# Patient Record
Sex: FEMALE | Race: WHITE | Employment: OTHER | URBAN - METROPOLITAN AREA
[De-identification: names, ages, dates, MRNs, and addresses within clinical notes are randomized per-mention and may not be internally consistent; named-entity substitution may affect disease eponyms.]

---

## 2019-01-01 ENCOUNTER — APPOINTMENT (OUTPATIENT)
Dept: CT IMAGING | Age: 84
End: 2019-01-01
Payer: MEDICARE

## 2019-01-01 ENCOUNTER — APPOINTMENT (OUTPATIENT)
Dept: GENERAL RADIOLOGY | Age: 84
End: 2019-01-01
Payer: MEDICARE

## 2019-01-01 ENCOUNTER — APPOINTMENT (OUTPATIENT)
Dept: CT IMAGING | Age: 84
DRG: 057 | End: 2019-01-01
Payer: MEDICARE

## 2019-01-01 ENCOUNTER — HOSPITAL ENCOUNTER (EMERGENCY)
Age: 84
Discharge: HOME | End: 2019-07-27
Attending: EMERGENCY MEDICINE
Payer: MEDICARE

## 2019-01-01 ENCOUNTER — HOSPITAL ENCOUNTER (INPATIENT)
Age: 84
LOS: 18 days | Discharge: HOME HEALTH | DRG: 057 | End: 2019-07-03
Attending: EMERGENCY MEDICINE | Admitting: PSYCHIATRY & NEUROLOGY
Payer: MEDICARE

## 2019-01-01 VITALS
HEART RATE: 62 BPM | WEIGHT: 206 LBS | SYSTOLIC BLOOD PRESSURE: 113 MMHG | BODY MASS INDEX: 35.17 KG/M2 | HEIGHT: 64 IN | DIASTOLIC BLOOD PRESSURE: 63 MMHG | RESPIRATION RATE: 16 BRPM | OXYGEN SATURATION: 93 % | TEMPERATURE: 97.7 F

## 2019-01-01 VITALS
WEIGHT: 206 LBS | TEMPERATURE: 98.3 F | HEART RATE: 67 BPM | OXYGEN SATURATION: 93 % | DIASTOLIC BLOOD PRESSURE: 68 MMHG | BODY MASS INDEX: 35.17 KG/M2 | HEIGHT: 64 IN | RESPIRATION RATE: 19 BRPM | SYSTOLIC BLOOD PRESSURE: 126 MMHG

## 2019-01-01 DIAGNOSIS — F03.91 DEMENTIA WITH BEHAVIORAL DISTURBANCE, UNSPECIFIED DEMENTIA TYPE: Primary | ICD-10-CM

## 2019-01-01 DIAGNOSIS — F03.91 DEMENTIA WITH BEHAVIORAL DISTURBANCE, UNSPECIFIED DEMENTIA TYPE: ICD-10-CM

## 2019-01-01 DIAGNOSIS — R53.1 GENERALIZED WEAKNESS: Primary | ICD-10-CM

## 2019-01-01 LAB
A/G RATIO: 1.3 (ref 1.1–2.2)
A/G RATIO: 1.3 (ref 1.1–2.2)
ACETAMINOPHEN LEVEL: <5 UG/ML (ref 10–30)
ALBUMIN SERPL-MCNC: 3.7 G/DL (ref 3.4–5)
ALBUMIN SERPL-MCNC: 4 G/DL (ref 3.4–5)
ALP BLD-CCNC: 84 U/L (ref 40–129)
ALP BLD-CCNC: 92 U/L (ref 40–129)
ALT SERPL-CCNC: 10 U/L (ref 10–40)
ALT SERPL-CCNC: 23 U/L (ref 10–40)
AMPHETAMINE SCREEN, URINE: NORMAL
AMPHETAMINE SCREEN, URINE: NORMAL
ANION GAP SERPL CALCULATED.3IONS-SCNC: 11 MMOL/L (ref 3–16)
ANION GAP SERPL CALCULATED.3IONS-SCNC: 11 MMOL/L (ref 3–16)
AST SERPL-CCNC: 20 U/L (ref 15–37)
AST SERPL-CCNC: 28 U/L (ref 15–37)
BACTERIA: ABNORMAL /HPF
BARBITURATE SCREEN URINE: NORMAL
BARBITURATE SCREEN URINE: NORMAL
BASE EXCESS VENOUS: 2.9 MMOL/L (ref -3–3)
BASOPHILS ABSOLUTE: 0 K/UL (ref 0–0.2)
BASOPHILS ABSOLUTE: 0 K/UL (ref 0–0.2)
BASOPHILS RELATIVE PERCENT: 0.3 %
BASOPHILS RELATIVE PERCENT: 0.7 %
BENZODIAZEPINE SCREEN, URINE: NORMAL
BENZODIAZEPINE SCREEN, URINE: NORMAL
BILIRUB SERPL-MCNC: 0.4 MG/DL (ref 0–1)
BILIRUB SERPL-MCNC: 0.5 MG/DL (ref 0–1)
BILIRUBIN URINE: ABNORMAL
BILIRUBIN URINE: NEGATIVE
BLOOD CULTURE, ROUTINE: NORMAL
BLOOD, URINE: NEGATIVE
BLOOD, URINE: NEGATIVE
BUN BLDV-MCNC: 14 MG/DL (ref 7–20)
BUN BLDV-MCNC: 21 MG/DL (ref 7–20)
CALCIUM SERPL-MCNC: 9.1 MG/DL (ref 8.3–10.6)
CALCIUM SERPL-MCNC: 9.5 MG/DL (ref 8.3–10.6)
CANNABINOID SCREEN URINE: NORMAL
CANNABINOID SCREEN URINE: NORMAL
CARBAMAZEPINE DOSE: NORMAL
CARBAMAZEPINE LEVEL: 5.7 UG/ML (ref 4–12)
CARBOXYHEMOGLOBIN: 2.5 % (ref 0–1.5)
CASTS: ABNORMAL /LPF
CHLORIDE BLD-SCNC: 92 MMOL/L (ref 99–110)
CHLORIDE BLD-SCNC: 97 MMOL/L (ref 99–110)
CHOLESTEROL, TOTAL: 139 MG/DL (ref 0–199)
CLARITY: ABNORMAL
CLARITY: CLEAR
CO2: 27 MMOL/L (ref 21–32)
CO2: 28 MMOL/L (ref 21–32)
COCAINE METABOLITE SCREEN URINE: NORMAL
COCAINE METABOLITE SCREEN URINE: NORMAL
COLOR: YELLOW
COLOR: YELLOW
CREAT SERPL-MCNC: 1.3 MG/DL (ref 0.6–1.2)
CREAT SERPL-MCNC: 1.8 MG/DL (ref 0.6–1.2)
CULTURE, BLOOD 2: NORMAL
EKG ATRIAL RATE: 66 BPM
EKG ATRIAL RATE: 67 BPM
EKG ATRIAL RATE: 75 BPM
EKG DIAGNOSIS: NORMAL
EKG P AXIS: 100 DEGREES
EKG P-R INTERVAL: 248 MS
EKG P-R INTERVAL: 358 MS
EKG Q-T INTERVAL: 448 MS
EKG Q-T INTERVAL: 454 MS
EKG Q-T INTERVAL: 474 MS
EKG QRS DURATION: 164 MS
EKG QRS DURATION: 176 MS
EKG QRS DURATION: 98 MS
EKG QTC CALCULATION (BAZETT): 475 MS
EKG QTC CALCULATION (BAZETT): 500 MS
EKG QTC CALCULATION (BAZETT): 500 MS
EKG R AXIS: -74 DEGREES
EKG R AXIS: -75 DEGREES
EKG R AXIS: 66 DEGREES
EKG T AXIS: 17 DEGREES
EKG T AXIS: 87 DEGREES
EKG T AXIS: 90 DEGREES
EKG VENTRICULAR RATE: 66 BPM
EKG VENTRICULAR RATE: 67 BPM
EKG VENTRICULAR RATE: 75 BPM
EOSINOPHILS ABSOLUTE: 0 K/UL (ref 0–0.6)
EOSINOPHILS ABSOLUTE: 0.1 K/UL (ref 0–0.6)
EOSINOPHILS RELATIVE PERCENT: 0.3 %
EOSINOPHILS RELATIVE PERCENT: 1.4 %
EPITHELIAL CELLS, UA: ABNORMAL /HPF
EPITHELIAL CELLS, UA: ABNORMAL /HPF
ESTIMATED AVERAGE GLUCOSE: 85.3 MG/DL
ETHANOL: NORMAL MG/DL (ref 0–0.08)
ETHANOL: NORMAL MG/DL (ref 0–0.08)
GFR AFRICAN AMERICAN: 32
GFR AFRICAN AMERICAN: 47
GFR NON-AFRICAN AMERICAN: 27
GFR NON-AFRICAN AMERICAN: 39
GLOBULIN: 2.9 G/DL
GLOBULIN: 3 G/DL
GLUCOSE BLD-MCNC: 101 MG/DL (ref 70–99)
GLUCOSE BLD-MCNC: 107 MG/DL (ref 70–99)
GLUCOSE URINE: NEGATIVE MG/DL
GLUCOSE URINE: NEGATIVE MG/DL
HBA1C MFR BLD: 4.6 %
HCO3 VENOUS: 28.5 MMOL/L (ref 23–29)
HCT VFR BLD CALC: 37.7 % (ref 36–48)
HCT VFR BLD CALC: 40.6 % (ref 36–48)
HDLC SERPL-MCNC: 76 MG/DL (ref 40–60)
HEMOGLOBIN: 12.8 G/DL (ref 12–16)
HEMOGLOBIN: 13.8 G/DL (ref 12–16)
KETONES, URINE: NEGATIVE MG/DL
KETONES, URINE: NEGATIVE MG/DL
LACTIC ACID: 0.8 MMOL/L (ref 0.4–2)
LDL CHOLESTEROL CALCULATED: 49 MG/DL
LEUKOCYTE ESTERASE, URINE: ABNORMAL
LEUKOCYTE ESTERASE, URINE: NEGATIVE
LYMPHOCYTES ABSOLUTE: 0.6 K/UL (ref 1–5.1)
LYMPHOCYTES ABSOLUTE: 0.7 K/UL (ref 1–5.1)
LYMPHOCYTES RELATIVE PERCENT: 14.6 %
LYMPHOCYTES RELATIVE PERCENT: 15 %
Lab: NORMAL
Lab: NORMAL
MCH RBC QN AUTO: 32.6 PG (ref 26–34)
MCH RBC QN AUTO: 33.2 PG (ref 26–34)
MCHC RBC AUTO-ENTMCNC: 34 G/DL (ref 31–36)
MCHC RBC AUTO-ENTMCNC: 34.1 G/DL (ref 31–36)
MCV RBC AUTO: 96 FL (ref 80–100)
MCV RBC AUTO: 97.4 FL (ref 80–100)
METHADONE SCREEN, URINE: NORMAL
METHADONE SCREEN, URINE: NORMAL
METHEMOGLOBIN VENOUS: 0.3 %
MICROSCOPIC EXAMINATION: YES
MICROSCOPIC EXAMINATION: YES
MONOCYTES ABSOLUTE: 0.2 K/UL (ref 0–1.3)
MONOCYTES ABSOLUTE: 0.4 K/UL (ref 0–1.3)
MONOCYTES RELATIVE PERCENT: 6.4 %
MONOCYTES RELATIVE PERCENT: 8 %
MUCUS: ABNORMAL /LPF
NEUTROPHILS ABSOLUTE: 3 K/UL (ref 1.7–7.7)
NEUTROPHILS ABSOLUTE: 3.7 K/UL (ref 1.7–7.7)
NEUTROPHILS RELATIVE PERCENT: 74.9 %
NEUTROPHILS RELATIVE PERCENT: 78.4 %
NITRITE, URINE: NEGATIVE
NITRITE, URINE: NEGATIVE
O2 CONTENT, VEN: 17 VOL %
O2 SAT, VEN: 89 %
O2 THERAPY: ABNORMAL
OPIATE SCREEN URINE: NORMAL
OPIATE SCREEN URINE: NORMAL
OXYCODONE URINE: NORMAL
OXYCODONE URINE: NORMAL
PCO2, VEN: 47.1 MMHG (ref 40–50)
PDW BLD-RTO: 14.5 % (ref 12.4–15.4)
PDW BLD-RTO: 14.9 % (ref 12.4–15.4)
PH UA: 6
PH UA: 6
PH UA: 6 (ref 5–8)
PH UA: 7 (ref 5–8)
PH VENOUS: 7.4 (ref 7.35–7.45)
PHENCYCLIDINE SCREEN URINE: NORMAL
PHENCYCLIDINE SCREEN URINE: NORMAL
PLATELET # BLD: 130 K/UL (ref 135–450)
PLATELET # BLD: 159 K/UL (ref 135–450)
PMV BLD AUTO: 8.3 FL (ref 5–10.5)
PMV BLD AUTO: 8.4 FL (ref 5–10.5)
PO2, VEN: 56.4 MMHG (ref 25–40)
POTASSIUM REFLEX MAGNESIUM: 5.4 MMOL/L (ref 3.5–5.1)
POTASSIUM SERPL-SCNC: 4 MMOL/L (ref 3.5–5.1)
PROPOXYPHENE SCREEN: NORMAL
PROPOXYPHENE SCREEN: NORMAL
PROTEIN UA: ABNORMAL MG/DL
PROTEIN UA: NEGATIVE MG/DL
RBC # BLD: 3.87 M/UL (ref 4–5.2)
RBC # BLD: 4.23 M/UL (ref 4–5.2)
RBC UA: ABNORMAL /HPF (ref 0–2)
RBC UA: ABNORMAL /HPF (ref 0–2)
SALICYLATE, SERUM: <0.3 MG/DL (ref 15–30)
SODIUM BLD-SCNC: 130 MMOL/L (ref 136–145)
SODIUM BLD-SCNC: 136 MMOL/L (ref 136–145)
SPECIFIC GRAVITY UA: 1.01 (ref 1–1.03)
SPECIFIC GRAVITY UA: 1.02 (ref 1–1.03)
T4 TOTAL: 10.2 UG/DL (ref 4.5–10.9)
TCO2 CALC VENOUS: 30 MMOL/L
TOTAL PROTEIN: 6.6 G/DL (ref 6.4–8.2)
TOTAL PROTEIN: 7 G/DL (ref 6.4–8.2)
TRIGL SERPL-MCNC: 72 MG/DL (ref 0–150)
TROPONIN: <0.01 NG/ML
TSH SERPL DL<=0.05 MIU/L-ACNC: 2.05 UIU/ML (ref 0.27–4.2)
URINE REFLEX TO CULTURE: ABNORMAL
URINE TYPE: ABNORMAL
URINE TYPE: ABNORMAL
UROBILINOGEN, URINE: 0.2 E.U./DL
UROBILINOGEN, URINE: 0.2 E.U./DL
VLDLC SERPL CALC-MCNC: 14 MG/DL
WBC # BLD: 3.9 K/UL (ref 4–11)
WBC # BLD: 5 K/UL (ref 4–11)
WBC UA: ABNORMAL /HPF (ref 0–5)
WBC UA: ABNORMAL /HPF (ref 0–5)

## 2019-01-01 PROCEDURE — 36415 COLL VENOUS BLD VENIPUNCTURE: CPT

## 2019-01-01 PROCEDURE — 6370000000 HC RX 637 (ALT 250 FOR IP): Performed by: PSYCHIATRY & NEUROLOGY

## 2019-01-01 PROCEDURE — 1240000000 HC EMOTIONAL WELLNESS R&B

## 2019-01-01 PROCEDURE — 99232 SBSQ HOSP IP/OBS MODERATE 35: CPT | Performed by: PSYCHIATRY & NEUROLOGY

## 2019-01-01 PROCEDURE — 93005 ELECTROCARDIOGRAM TRACING: CPT | Performed by: EMERGENCY MEDICINE

## 2019-01-01 PROCEDURE — 6370000000 HC RX 637 (ALT 250 FOR IP): Performed by: EMERGENCY MEDICINE

## 2019-01-01 PROCEDURE — 80307 DRUG TEST PRSMV CHEM ANLYZR: CPT

## 2019-01-01 PROCEDURE — 99223 1ST HOSP IP/OBS HIGH 75: CPT | Performed by: PSYCHIATRY & NEUROLOGY

## 2019-01-01 PROCEDURE — 84484 ASSAY OF TROPONIN QUANT: CPT

## 2019-01-01 PROCEDURE — G0480 DRUG TEST DEF 1-7 CLASSES: HCPCS

## 2019-01-01 PROCEDURE — 97116 GAIT TRAINING THERAPY: CPT

## 2019-01-01 PROCEDURE — 99233 SBSQ HOSP IP/OBS HIGH 50: CPT | Performed by: PSYCHIATRY & NEUROLOGY

## 2019-01-01 PROCEDURE — 97535 SELF CARE MNGMENT TRAINING: CPT

## 2019-01-01 PROCEDURE — 83605 ASSAY OF LACTIC ACID: CPT

## 2019-01-01 PROCEDURE — 99285 EMERGENCY DEPT VISIT HI MDM: CPT

## 2019-01-01 PROCEDURE — 93010 ELECTROCARDIOGRAM REPORT: CPT | Performed by: INTERNAL MEDICINE

## 2019-01-01 PROCEDURE — 96361 HYDRATE IV INFUSION ADD-ON: CPT

## 2019-01-01 PROCEDURE — 97161 PT EVAL LOW COMPLEX 20 MIN: CPT

## 2019-01-01 PROCEDURE — 97110 THERAPEUTIC EXERCISES: CPT

## 2019-01-01 PROCEDURE — 80156 ASSAY CARBAMAZEPINE TOTAL: CPT

## 2019-01-01 PROCEDURE — 85025 COMPLETE CBC W/AUTO DIFF WBC: CPT

## 2019-01-01 PROCEDURE — 94150 VITAL CAPACITY TEST: CPT

## 2019-01-01 PROCEDURE — 84443 ASSAY THYROID STIM HORMONE: CPT

## 2019-01-01 PROCEDURE — 70450 CT HEAD/BRAIN W/O DYE: CPT

## 2019-01-01 PROCEDURE — 72125 CT NECK SPINE W/O DYE: CPT

## 2019-01-01 PROCEDURE — 99233 SBSQ HOSP IP/OBS HIGH 50: CPT | Performed by: NURSE PRACTITIONER

## 2019-01-01 PROCEDURE — 2580000003 HC RX 258: Performed by: EMERGENCY MEDICINE

## 2019-01-01 PROCEDURE — 81001 URINALYSIS AUTO W/SCOPE: CPT

## 2019-01-01 PROCEDURE — 97530 THERAPEUTIC ACTIVITIES: CPT

## 2019-01-01 PROCEDURE — 51701 INSERT BLADDER CATHETER: CPT

## 2019-01-01 PROCEDURE — 93005 ELECTROCARDIOGRAM TRACING: CPT | Performed by: PSYCHIATRY & NEUROLOGY

## 2019-01-01 PROCEDURE — 96372 THER/PROPH/DIAG INJ SC/IM: CPT

## 2019-01-01 PROCEDURE — 74176 CT ABD & PELVIS W/O CONTRAST: CPT

## 2019-01-01 PROCEDURE — 99239 HOSP IP/OBS DSCHRG MGMT >30: CPT | Performed by: PSYCHIATRY & NEUROLOGY

## 2019-01-01 PROCEDURE — 71045 X-RAY EXAM CHEST 1 VIEW: CPT

## 2019-01-01 PROCEDURE — 2500000003 HC RX 250 WO HCPCS: Performed by: PSYCHIATRY & NEUROLOGY

## 2019-01-01 PROCEDURE — 5130000000 HC BRIDGE APPOINTMENT

## 2019-01-01 PROCEDURE — 80053 COMPREHEN METABOLIC PANEL: CPT

## 2019-01-01 PROCEDURE — 2580000003 HC RX 258

## 2019-01-01 PROCEDURE — 97165 OT EVAL LOW COMPLEX 30 MIN: CPT

## 2019-01-01 PROCEDURE — 87040 BLOOD CULTURE FOR BACTERIA: CPT

## 2019-01-01 PROCEDURE — 84436 ASSAY OF TOTAL THYROXINE: CPT

## 2019-01-01 PROCEDURE — 96360 HYDRATION IV INFUSION INIT: CPT

## 2019-01-01 PROCEDURE — 82803 BLOOD GASES ANY COMBINATION: CPT

## 2019-01-01 PROCEDURE — 83036 HEMOGLOBIN GLYCOSYLATED A1C: CPT

## 2019-01-01 PROCEDURE — 99222 1ST HOSP IP/OBS MODERATE 55: CPT | Performed by: PHYSICIAN ASSISTANT

## 2019-01-01 PROCEDURE — 80061 LIPID PANEL: CPT

## 2019-01-01 RX ORDER — TRAZODONE HYDROCHLORIDE 50 MG/1
25 TABLET ORAL NIGHTLY PRN
Status: DISCONTINUED | OUTPATIENT
Start: 2019-01-01 | End: 2019-01-01 | Stop reason: HOSPADM

## 2019-01-01 RX ORDER — LANOLIN ALCOHOL/MO/W.PET/CERES
6 CREAM (GRAM) TOPICAL NIGHTLY PRN
Status: DISCONTINUED | OUTPATIENT
Start: 2019-01-01 | End: 2019-01-01

## 2019-01-01 RX ORDER — ACETAMINOPHEN 325 MG/1
650 TABLET ORAL EVERY 4 HOURS PRN
Status: DISCONTINUED | OUTPATIENT
Start: 2019-01-01 | End: 2019-01-01 | Stop reason: HOSPADM

## 2019-01-01 RX ORDER — OLANZAPINE 5 MG/1
2.5 TABLET ORAL EVERY 6 HOURS PRN
Status: DISCONTINUED | OUTPATIENT
Start: 2019-01-01 | End: 2019-01-01 | Stop reason: HOSPADM

## 2019-01-01 RX ORDER — DONEPEZIL HYDROCHLORIDE 5 MG/1
5 TABLET, FILM COATED ORAL
Qty: 30 TABLET | Refills: 1 | Status: SHIPPED | OUTPATIENT
Start: 2019-01-01

## 2019-01-01 RX ORDER — DONEPEZIL HYDROCHLORIDE 5 MG/1
5 TABLET, FILM COATED ORAL NIGHTLY
Status: DISCONTINUED | OUTPATIENT
Start: 2019-01-01 | End: 2019-01-01 | Stop reason: HOSPADM

## 2019-01-01 RX ORDER — LANOLIN ALCOHOL/MO/W.PET/CERES
6 CREAM (GRAM) TOPICAL NIGHTLY
Qty: 60 TABLET | Refills: 1 | Status: SHIPPED | OUTPATIENT
Start: 2019-01-01

## 2019-01-01 RX ORDER — ASPIRIN 81 MG/1
81 TABLET, CHEWABLE ORAL DAILY
Status: DISCONTINUED | OUTPATIENT
Start: 2019-01-01 | End: 2019-01-01

## 2019-01-01 RX ORDER — OLANZAPINE 10 MG/1
5 INJECTION, POWDER, LYOPHILIZED, FOR SOLUTION INTRAMUSCULAR 2 TIMES DAILY PRN
Status: DISCONTINUED | OUTPATIENT
Start: 2019-01-01 | End: 2019-01-01 | Stop reason: HOSPADM

## 2019-01-01 RX ORDER — SENNA PLUS 8.6 MG/1
1 TABLET ORAL
COMMUNITY

## 2019-01-01 RX ORDER — CARBAMAZEPINE 200 MG/1
100 TABLET ORAL 2 TIMES DAILY
Qty: 30 TABLET | Refills: 1 | Status: SHIPPED | OUTPATIENT
Start: 2019-01-01

## 2019-01-01 RX ORDER — CEFUROXIME AXETIL 250 MG/1
500 TABLET ORAL ONCE
Status: COMPLETED | OUTPATIENT
Start: 2019-01-01 | End: 2019-01-01

## 2019-01-01 RX ORDER — RISPERIDONE 1 MG/1
1 TABLET, ORALLY DISINTEGRATING ORAL 2 TIMES DAILY
Status: DISCONTINUED | OUTPATIENT
Start: 2019-01-01 | End: 2019-01-01

## 2019-01-01 RX ORDER — QUETIAPINE FUMARATE 25 MG/1
25 TABLET, FILM COATED ORAL ONCE
Status: COMPLETED | OUTPATIENT
Start: 2019-01-01 | End: 2019-01-01

## 2019-01-01 RX ORDER — HYDROCHLOROTHIAZIDE 12.5 MG/1
12.5 CAPSULE, GELATIN COATED ORAL DAILY
Status: ON HOLD | COMMUNITY
Start: 2019-01-01 | End: 2019-01-01 | Stop reason: HOSPADM

## 2019-01-01 RX ORDER — CARBAMAZEPINE 200 MG/1
100 TABLET ORAL 2 TIMES DAILY
Status: DISCONTINUED | OUTPATIENT
Start: 2019-01-01 | End: 2019-01-01 | Stop reason: HOSPADM

## 2019-01-01 RX ORDER — RISPERIDONE 1 MG/1
TABLET, FILM COATED ORAL
Qty: 90 TABLET | Refills: 1 | Status: SHIPPED | OUTPATIENT
Start: 2019-01-01

## 2019-01-01 RX ORDER — SENNA PLUS 8.6 MG/1
2 TABLET ORAL DAILY
Status: DISCONTINUED | OUTPATIENT
Start: 2019-01-01 | End: 2019-01-01 | Stop reason: HOSPADM

## 2019-01-01 RX ORDER — METOPROLOL SUCCINATE 50 MG/1
50 TABLET, EXTENDED RELEASE ORAL DAILY
COMMUNITY

## 2019-01-01 RX ORDER — LANOLIN ALCOHOL/MO/W.PET/CERES
6 CREAM (GRAM) TOPICAL NIGHTLY
Status: DISCONTINUED | OUTPATIENT
Start: 2019-01-01 | End: 2019-01-01 | Stop reason: HOSPADM

## 2019-01-01 RX ORDER — LORAZEPAM 0.5 MG/1
0.5 TABLET ORAL EVERY 4 HOURS PRN
Status: DISCONTINUED | OUTPATIENT
Start: 2019-01-01 | End: 2019-01-01 | Stop reason: HOSPADM

## 2019-01-01 RX ORDER — METOPROLOL SUCCINATE 25 MG/1
50 TABLET, EXTENDED RELEASE ORAL DAILY
Status: DISCONTINUED | OUTPATIENT
Start: 2019-01-01 | End: 2019-01-01 | Stop reason: HOSPADM

## 2019-01-01 RX ORDER — OLANZAPINE 10 MG/1
5 INJECTION, POWDER, LYOPHILIZED, FOR SOLUTION INTRAMUSCULAR ONCE
Status: COMPLETED | OUTPATIENT
Start: 2019-01-01 | End: 2019-01-01

## 2019-01-01 RX ORDER — RISPERIDONE 1 MG/1
1 TABLET, ORALLY DISINTEGRATING ORAL DAILY
Status: DISCONTINUED | OUTPATIENT
Start: 2019-01-01 | End: 2019-01-01 | Stop reason: HOSPADM

## 2019-01-01 RX ORDER — RISPERIDONE 1 MG/1
2 TABLET, ORALLY DISINTEGRATING ORAL NIGHTLY
Status: DISCONTINUED | OUTPATIENT
Start: 2019-01-01 | End: 2019-01-01 | Stop reason: HOSPADM

## 2019-01-01 RX ORDER — HYDROCHLOROTHIAZIDE 25 MG/1
12.5 TABLET ORAL DAILY
Status: DISCONTINUED | OUTPATIENT
Start: 2019-01-01 | End: 2019-01-01

## 2019-01-01 RX ORDER — METOPROLOL TARTRATE 50 MG/1
50 TABLET, FILM COATED ORAL 2 TIMES DAILY
Status: ON HOLD | COMMUNITY
Start: 2019-01-01 | End: 2019-01-01 | Stop reason: HOSPADM

## 2019-01-01 RX ORDER — ALBUTEROL SULFATE 90 UG/1
2 AEROSOL, METERED RESPIRATORY (INHALATION) EVERY 6 HOURS PRN
Status: DISCONTINUED | OUTPATIENT
Start: 2019-01-01 | End: 2019-01-01 | Stop reason: HOSPADM

## 2019-01-01 RX ORDER — AMIODARONE HYDROCHLORIDE 200 MG/1
200 TABLET ORAL NIGHTLY
Status: DISCONTINUED | OUTPATIENT
Start: 2019-01-01 | End: 2019-01-01 | Stop reason: HOSPADM

## 2019-01-01 RX ORDER — ATORVASTATIN CALCIUM 10 MG/1
20 TABLET, FILM COATED ORAL DAILY
Status: DISCONTINUED | OUTPATIENT
Start: 2019-01-01 | End: 2019-01-01

## 2019-01-01 RX ORDER — TRAZODONE HYDROCHLORIDE 50 MG/1
25 TABLET ORAL NIGHTLY PRN
Qty: 15 TABLET | Refills: 1 | Status: SHIPPED | OUTPATIENT
Start: 2019-01-01

## 2019-01-01 RX ORDER — QUETIAPINE FUMARATE 25 MG/1
25 TABLET, FILM COATED ORAL NIGHTLY
Status: ON HOLD | COMMUNITY
End: 2019-01-01 | Stop reason: HOSPADM

## 2019-01-01 RX ORDER — 0.9 % SODIUM CHLORIDE 0.9 %
1000 INTRAVENOUS SOLUTION INTRAVENOUS ONCE
Status: COMPLETED | OUTPATIENT
Start: 2019-01-01 | End: 2019-01-01

## 2019-01-01 RX ORDER — MAGNESIUM HYDROXIDE/ALUMINUM HYDROXICE/SIMETHICONE 120; 1200; 1200 MG/30ML; MG/30ML; MG/30ML
30 SUSPENSION ORAL EVERY 6 HOURS PRN
Status: DISCONTINUED | OUTPATIENT
Start: 2019-01-01 | End: 2019-01-01 | Stop reason: HOSPADM

## 2019-01-01 RX ADMIN — CARBAMAZEPINE 100 MG: 200 TABLET ORAL at 08:35

## 2019-01-01 RX ADMIN — AMIODARONE HYDROCHLORIDE 200 MG: 200 TABLET ORAL at 20:39

## 2019-01-01 RX ADMIN — DONEPEZIL HYDROCHLORIDE 5 MG: 5 TABLET, FILM COATED ORAL at 20:14

## 2019-01-01 RX ADMIN — CARBAMAZEPINE 100 MG: 200 TABLET ORAL at 21:16

## 2019-01-01 RX ADMIN — DONEPEZIL HYDROCHLORIDE 5 MG: 5 TABLET, FILM COATED ORAL at 20:44

## 2019-01-01 RX ADMIN — RISPERIDONE 1 MG: 1 TABLET, ORALLY DISINTEGRATING ORAL at 21:25

## 2019-01-01 RX ADMIN — SENNOSIDES 17.2 MG: 8.6 TABLET, FILM COATED ORAL at 09:07

## 2019-01-01 RX ADMIN — DONEPEZIL HYDROCHLORIDE 5 MG: 5 TABLET, FILM COATED ORAL at 20:39

## 2019-01-01 RX ADMIN — ACETAMINOPHEN 650 MG: 325 TABLET ORAL at 20:11

## 2019-01-01 RX ADMIN — METOPROLOL SUCCINATE 50 MG: 25 TABLET, FILM COATED, EXTENDED RELEASE ORAL at 08:10

## 2019-01-01 RX ADMIN — DONEPEZIL HYDROCHLORIDE 5 MG: 5 TABLET, FILM COATED ORAL at 20:52

## 2019-01-01 RX ADMIN — TRAZODONE HYDROCHLORIDE 25 MG: 50 TABLET ORAL at 20:45

## 2019-01-01 RX ADMIN — CARBAMAZEPINE 100 MG: 200 TABLET ORAL at 08:24

## 2019-01-01 RX ADMIN — AMIODARONE HYDROCHLORIDE 200 MG: 200 TABLET ORAL at 20:21

## 2019-01-01 RX ADMIN — METOPROLOL SUCCINATE 50 MG: 25 TABLET, FILM COATED, EXTENDED RELEASE ORAL at 09:43

## 2019-01-01 RX ADMIN — CARBAMAZEPINE 100 MG: 200 TABLET ORAL at 14:21

## 2019-01-01 RX ADMIN — WATER: 1 INJECTION INTRAMUSCULAR; INTRAVENOUS; SUBCUTANEOUS at 03:48

## 2019-01-01 RX ADMIN — ASPIRIN 81 MG 81 MG: 81 TABLET ORAL at 17:52

## 2019-01-01 RX ADMIN — AMIODARONE HYDROCHLORIDE 200 MG: 200 TABLET ORAL at 21:53

## 2019-01-01 RX ADMIN — METOPROLOL SUCCINATE 50 MG: 25 TABLET, FILM COATED, EXTENDED RELEASE ORAL at 08:34

## 2019-01-01 RX ADMIN — DONEPEZIL HYDROCHLORIDE 5 MG: 5 TABLET, FILM COATED ORAL at 20:29

## 2019-01-01 RX ADMIN — METOPROLOL SUCCINATE 50 MG: 25 TABLET, FILM COATED, EXTENDED RELEASE ORAL at 08:24

## 2019-01-01 RX ADMIN — SENNOSIDES 17.2 MG: 8.6 TABLET, FILM COATED ORAL at 09:05

## 2019-01-01 RX ADMIN — SENNOSIDES 17.2 MG: 8.6 TABLET, FILM COATED ORAL at 09:43

## 2019-01-01 RX ADMIN — RISPERIDONE 2 MG: 1 TABLET, ORALLY DISINTEGRATING ORAL at 20:30

## 2019-01-01 RX ADMIN — METOPROLOL SUCCINATE 50 MG: 25 TABLET, FILM COATED, EXTENDED RELEASE ORAL at 08:51

## 2019-01-01 RX ADMIN — DONEPEZIL HYDROCHLORIDE 5 MG: 5 TABLET, FILM COATED ORAL at 21:39

## 2019-01-01 RX ADMIN — ACETAMINOPHEN 650 MG: 325 TABLET ORAL at 20:45

## 2019-01-01 RX ADMIN — CARBAMAZEPINE 100 MG: 200 TABLET ORAL at 08:22

## 2019-01-01 RX ADMIN — RISPERIDONE 1 MG: 1 TABLET, ORALLY DISINTEGRATING ORAL at 21:53

## 2019-01-01 RX ADMIN — SENNOSIDES 17.2 MG: 8.6 TABLET, FILM COATED ORAL at 08:22

## 2019-01-01 RX ADMIN — SENNOSIDES 17.2 MG: 8.6 TABLET, FILM COATED ORAL at 09:32

## 2019-01-01 RX ADMIN — RISPERIDONE 2 MG: 1 TABLET, ORALLY DISINTEGRATING ORAL at 20:40

## 2019-01-01 RX ADMIN — MELATONIN 3 MG ORAL TABLET 6 MG: 3 TABLET ORAL at 20:28

## 2019-01-01 RX ADMIN — METOPROLOL SUCCINATE 50 MG: 25 TABLET, FILM COATED, EXTENDED RELEASE ORAL at 17:51

## 2019-01-01 RX ADMIN — MELATONIN 3 MG ORAL TABLET 6 MG: 3 TABLET ORAL at 20:45

## 2019-01-01 RX ADMIN — MELATONIN 3 MG ORAL TABLET 6 MG: 3 TABLET ORAL at 20:40

## 2019-01-01 RX ADMIN — CARBAMAZEPINE 100 MG: 200 TABLET ORAL at 10:38

## 2019-01-01 RX ADMIN — CARBAMAZEPINE 100 MG: 200 TABLET ORAL at 20:38

## 2019-01-01 RX ADMIN — CARBAMAZEPINE 100 MG: 200 TABLET ORAL at 09:43

## 2019-01-01 RX ADMIN — SENNOSIDES 17.2 MG: 8.6 TABLET, FILM COATED ORAL at 17:52

## 2019-01-01 RX ADMIN — CARBAMAZEPINE 100 MG: 200 TABLET ORAL at 09:31

## 2019-01-01 RX ADMIN — DONEPEZIL HYDROCHLORIDE 5 MG: 5 TABLET, FILM COATED ORAL at 20:22

## 2019-01-01 RX ADMIN — CARBAMAZEPINE 100 MG: 200 TABLET ORAL at 20:14

## 2019-01-01 RX ADMIN — RISPERIDONE 2 MG: 1 TABLET, ORALLY DISINTEGRATING ORAL at 20:21

## 2019-01-01 RX ADMIN — CARBAMAZEPINE 100 MG: 200 TABLET ORAL at 10:13

## 2019-01-01 RX ADMIN — CARBAMAZEPINE 100 MG: 200 TABLET ORAL at 08:51

## 2019-01-01 RX ADMIN — SENNOSIDES 17.2 MG: 8.6 TABLET, FILM COATED ORAL at 08:35

## 2019-01-01 RX ADMIN — RISPERIDONE 1 MG: 1 TABLET, ORALLY DISINTEGRATING ORAL at 08:12

## 2019-01-01 RX ADMIN — AMIODARONE HYDROCHLORIDE 200 MG: 200 TABLET ORAL at 21:04

## 2019-01-01 RX ADMIN — CARBAMAZEPINE 100 MG: 200 TABLET ORAL at 21:26

## 2019-01-01 RX ADMIN — ASPIRIN 81 MG 81 MG: 81 TABLET ORAL at 08:11

## 2019-01-01 RX ADMIN — MELATONIN 3 MG ORAL TABLET 6 MG: 3 TABLET ORAL at 21:53

## 2019-01-01 RX ADMIN — RISPERIDONE 1 MG: 1 TABLET, ORALLY DISINTEGRATING ORAL at 12:29

## 2019-01-01 RX ADMIN — QUETIAPINE FUMARATE 25 MG: 25 TABLET ORAL at 01:55

## 2019-01-01 RX ADMIN — CARBAMAZEPINE 100 MG: 200 TABLET ORAL at 09:05

## 2019-01-01 RX ADMIN — RISPERIDONE 2 MG: 1 TABLET, ORALLY DISINTEGRATING ORAL at 20:52

## 2019-01-01 RX ADMIN — CARBAMAZEPINE 100 MG: 200 TABLET ORAL at 12:29

## 2019-01-01 RX ADMIN — CARBAMAZEPINE 100 MG: 200 TABLET ORAL at 21:10

## 2019-01-01 RX ADMIN — TRAZODONE HYDROCHLORIDE 25 MG: 50 TABLET ORAL at 19:56

## 2019-01-01 RX ADMIN — RISPERIDONE 1 MG: 1 TABLET, ORALLY DISINTEGRATING ORAL at 09:05

## 2019-01-01 RX ADMIN — MELATONIN 3 MG ORAL TABLET 6 MG: 3 TABLET ORAL at 20:21

## 2019-01-01 RX ADMIN — MELATONIN 3 MG ORAL TABLET 6 MG: 3 TABLET ORAL at 21:07

## 2019-01-01 RX ADMIN — AMIODARONE HYDROCHLORIDE 200 MG: 200 TABLET ORAL at 20:36

## 2019-01-01 RX ADMIN — MELATONIN 3 MG ORAL TABLET 6 MG: 3 TABLET ORAL at 20:52

## 2019-01-01 RX ADMIN — RISPERIDONE 1 MG: 1 TABLET, ORALLY DISINTEGRATING ORAL at 20:11

## 2019-01-01 RX ADMIN — CARBAMAZEPINE 100 MG: 200 TABLET ORAL at 20:52

## 2019-01-01 RX ADMIN — RISPERIDONE 1 MG: 1 TABLET, ORALLY DISINTEGRATING ORAL at 09:07

## 2019-01-01 RX ADMIN — CARBAMAZEPINE 100 MG: 200 TABLET ORAL at 08:12

## 2019-01-01 RX ADMIN — MELATONIN 3 MG ORAL TABLET 6 MG: 3 TABLET ORAL at 20:38

## 2019-01-01 RX ADMIN — RISPERIDONE 1 MG: 1 TABLET, ORALLY DISINTEGRATING ORAL at 08:35

## 2019-01-01 RX ADMIN — SENNOSIDES 17.2 MG: 8.6 TABLET, FILM COATED ORAL at 10:38

## 2019-01-01 RX ADMIN — RISPERIDONE 2 MG: 1 TABLET, ORALLY DISINTEGRATING ORAL at 20:14

## 2019-01-01 RX ADMIN — CARBAMAZEPINE 100 MG: 200 TABLET ORAL at 20:11

## 2019-01-01 RX ADMIN — RISPERIDONE 2 MG: 1 TABLET, ORALLY DISINTEGRATING ORAL at 20:45

## 2019-01-01 RX ADMIN — AMIODARONE HYDROCHLORIDE 200 MG: 200 TABLET ORAL at 21:40

## 2019-01-01 RX ADMIN — CARBAMAZEPINE 100 MG: 200 TABLET ORAL at 20:42

## 2019-01-01 RX ADMIN — CARBAMAZEPINE 100 MG: 200 TABLET ORAL at 19:57

## 2019-01-01 RX ADMIN — AMIODARONE HYDROCHLORIDE 200 MG: 200 TABLET ORAL at 20:52

## 2019-01-01 RX ADMIN — AMIODARONE HYDROCHLORIDE 200 MG: 200 TABLET ORAL at 20:29

## 2019-01-01 RX ADMIN — RISPERIDONE 1 MG: 1 TABLET, ORALLY DISINTEGRATING ORAL at 08:24

## 2019-01-01 RX ADMIN — RISPERIDONE 1 MG: 1 TABLET, ORALLY DISINTEGRATING ORAL at 20:36

## 2019-01-01 RX ADMIN — RISPERIDONE 1 MG: 1 TABLET, ORALLY DISINTEGRATING ORAL at 08:51

## 2019-01-01 RX ADMIN — CARBAMAZEPINE 100 MG: 200 TABLET ORAL at 20:22

## 2019-01-01 RX ADMIN — ASPIRIN 81 MG 81 MG: 81 TABLET ORAL at 08:36

## 2019-01-01 RX ADMIN — DONEPEZIL HYDROCHLORIDE 5 MG: 5 TABLET, FILM COATED ORAL at 21:04

## 2019-01-01 RX ADMIN — SENNOSIDES 17.2 MG: 8.6 TABLET, FILM COATED ORAL at 08:11

## 2019-01-01 RX ADMIN — METOPROLOL SUCCINATE 50 MG: 25 TABLET, FILM COATED, EXTENDED RELEASE ORAL at 09:06

## 2019-01-01 RX ADMIN — DONEPEZIL HYDROCHLORIDE 5 MG: 5 TABLET, FILM COATED ORAL at 20:45

## 2019-01-01 RX ADMIN — RISPERIDONE 2 MG: 1 TABLET, ORALLY DISINTEGRATING ORAL at 20:38

## 2019-01-01 RX ADMIN — CARBAMAZEPINE 100 MG: 200 TABLET ORAL at 20:21

## 2019-01-01 RX ADMIN — METOPROLOL SUCCINATE 50 MG: 25 TABLET, FILM COATED, EXTENDED RELEASE ORAL at 08:23

## 2019-01-01 RX ADMIN — RISPERIDONE 1 MG: 1 TABLET, ORALLY DISINTEGRATING ORAL at 20:22

## 2019-01-01 RX ADMIN — SENNOSIDES 17.2 MG: 8.6 TABLET, FILM COATED ORAL at 10:13

## 2019-01-01 RX ADMIN — SENNOSIDES 17.2 MG: 8.6 TABLET, FILM COATED ORAL at 10:55

## 2019-01-01 RX ADMIN — RISPERIDONE 1 MG: 1 TABLET, ORALLY DISINTEGRATING ORAL at 10:39

## 2019-01-01 RX ADMIN — RISPERIDONE 1 MG: 1 TABLET, ORALLY DISINTEGRATING ORAL at 10:13

## 2019-01-01 RX ADMIN — RISPERIDONE 1 MG: 1 TABLET, ORALLY DISINTEGRATING ORAL at 19:56

## 2019-01-01 RX ADMIN — RISPERIDONE 1 MG: 1 TABLET, ORALLY DISINTEGRATING ORAL at 08:23

## 2019-01-01 RX ADMIN — OLANZAPINE 5 MG: 10 INJECTION, POWDER, LYOPHILIZED, FOR SOLUTION INTRAMUSCULAR at 03:05

## 2019-01-01 RX ADMIN — CARBAMAZEPINE 100 MG: 200 TABLET ORAL at 09:32

## 2019-01-01 RX ADMIN — CEFUROXIME AXETIL 500 MG: 250 TABLET ORAL at 00:44

## 2019-01-01 RX ADMIN — CARBAMAZEPINE 100 MG: 200 TABLET ORAL at 20:44

## 2019-01-01 RX ADMIN — CARBAMAZEPINE 100 MG: 200 TABLET ORAL at 21:40

## 2019-01-01 RX ADMIN — RISPERIDONE 1 MG: 1 TABLET, ORALLY DISINTEGRATING ORAL at 08:34

## 2019-01-01 RX ADMIN — MELATONIN 3 MG ORAL TABLET 6 MG: 3 TABLET ORAL at 20:14

## 2019-01-01 RX ADMIN — METOPROLOL SUCCINATE 50 MG: 25 TABLET, FILM COATED, EXTENDED RELEASE ORAL at 10:13

## 2019-01-01 RX ADMIN — RISPERIDONE 1 MG: 1 TABLET, ORALLY DISINTEGRATING ORAL at 09:32

## 2019-01-01 RX ADMIN — METOPROLOL SUCCINATE 50 MG: 25 TABLET, FILM COATED, EXTENDED RELEASE ORAL at 10:39

## 2019-01-01 RX ADMIN — CARBAMAZEPINE 100 MG: 200 TABLET ORAL at 20:45

## 2019-01-01 RX ADMIN — CARBAMAZEPINE 100 MG: 200 TABLET ORAL at 08:10

## 2019-01-01 RX ADMIN — METOPROLOL SUCCINATE 50 MG: 25 TABLET, FILM COATED, EXTENDED RELEASE ORAL at 09:05

## 2019-01-01 RX ADMIN — DONEPEZIL HYDROCHLORIDE 5 MG: 5 TABLET, FILM COATED ORAL at 20:21

## 2019-01-01 RX ADMIN — MELATONIN 3 MG ORAL TABLET 6 MG: 3 TABLET ORAL at 20:10

## 2019-01-01 RX ADMIN — LORAZEPAM 0.5 MG: 0.5 TABLET ORAL at 20:11

## 2019-01-01 RX ADMIN — AMIODARONE HYDROCHLORIDE 200 MG: 200 TABLET ORAL at 20:38

## 2019-01-01 RX ADMIN — RISPERIDONE 1 MG: 1 TABLET, ORALLY DISINTEGRATING ORAL at 09:43

## 2019-01-01 RX ADMIN — SENNOSIDES 17.2 MG: 8.6 TABLET, FILM COATED ORAL at 08:51

## 2019-01-01 RX ADMIN — CARBAMAZEPINE 100 MG: 200 TABLET ORAL at 20:28

## 2019-01-01 RX ADMIN — AMIODARONE HYDROCHLORIDE 200 MG: 200 TABLET ORAL at 20:10

## 2019-01-01 RX ADMIN — MELATONIN 3 MG ORAL TABLET 6 MG: 3 TABLET ORAL at 20:29

## 2019-01-01 RX ADMIN — AMIODARONE HYDROCHLORIDE 200 MG: 200 TABLET ORAL at 20:28

## 2019-01-01 RX ADMIN — CARBAMAZEPINE 100 MG: 200 TABLET ORAL at 08:36

## 2019-01-01 RX ADMIN — AMIODARONE HYDROCHLORIDE 200 MG: 200 TABLET ORAL at 20:46

## 2019-01-01 RX ADMIN — RISPERIDONE 1 MG: 1 TABLET, ORALLY DISINTEGRATING ORAL at 10:55

## 2019-01-01 RX ADMIN — METOPROLOL SUCCINATE 50 MG: 25 TABLET, FILM COATED, EXTENDED RELEASE ORAL at 09:32

## 2019-01-01 RX ADMIN — METOPROLOL SUCCINATE 50 MG: 25 TABLET, FILM COATED, EXTENDED RELEASE ORAL at 08:35

## 2019-01-01 RX ADMIN — AMIODARONE HYDROCHLORIDE 200 MG: 200 TABLET ORAL at 20:22

## 2019-01-01 RX ADMIN — MELATONIN 3 MG ORAL TABLET 6 MG: 3 TABLET ORAL at 21:04

## 2019-01-01 RX ADMIN — SODIUM CHLORIDE 1000 ML: 9 INJECTION, SOLUTION INTRAVENOUS at 11:30

## 2019-01-01 RX ADMIN — CARBAMAZEPINE 100 MG: 200 TABLET ORAL at 08:34

## 2019-01-01 RX ADMIN — SENNOSIDES 17.2 MG: 8.6 TABLET, FILM COATED ORAL at 08:10

## 2019-01-01 RX ADMIN — HYDROCHLOROTHIAZIDE 12.5 MG: 25 TABLET ORAL at 08:11

## 2019-01-01 RX ADMIN — RISPERIDONE 1 MG: 1 TABLET, ORALLY DISINTEGRATING ORAL at 21:40

## 2019-01-01 RX ADMIN — RISPERIDONE 1 MG: 1 TABLET, ORALLY DISINTEGRATING ORAL at 08:36

## 2019-01-01 RX ADMIN — OLANZAPINE 5 MG: 10 INJECTION, POWDER, FOR SOLUTION INTRAMUSCULAR at 04:04

## 2019-01-01 RX ADMIN — DONEPEZIL HYDROCHLORIDE 5 MG: 5 TABLET, FILM COATED ORAL at 21:53

## 2019-01-01 RX ADMIN — DONEPEZIL HYDROCHLORIDE 5 MG: 5 TABLET, FILM COATED ORAL at 20:11

## 2019-01-01 RX ADMIN — AMIODARONE HYDROCHLORIDE 200 MG: 200 TABLET ORAL at 21:25

## 2019-01-01 RX ADMIN — RISPERIDONE 1 MG: 1 TABLET, ORALLY DISINTEGRATING ORAL at 20:45

## 2019-01-01 RX ADMIN — RISPERIDONE 1 MG: 1 TABLET, ORALLY DISINTEGRATING ORAL at 09:31

## 2019-01-01 RX ADMIN — AMIODARONE HYDROCHLORIDE 200 MG: 200 TABLET ORAL at 20:44

## 2019-01-01 RX ADMIN — RISPERIDONE 1 MG: 1 TABLET, ORALLY DISINTEGRATING ORAL at 20:28

## 2019-01-01 RX ADMIN — HYDROCHLOROTHIAZIDE 12.5 MG: 25 TABLET ORAL at 17:52

## 2019-01-01 RX ADMIN — CARBAMAZEPINE 100 MG: 200 TABLET ORAL at 10:55

## 2019-01-01 RX ADMIN — CARBAMAZEPINE 100 MG: 200 TABLET ORAL at 09:07

## 2019-01-01 RX ADMIN — ATORVASTATIN CALCIUM 20 MG: 10 TABLET, FILM COATED ORAL at 08:36

## 2019-01-01 RX ADMIN — ATORVASTATIN CALCIUM 20 MG: 10 TABLET, FILM COATED ORAL at 08:10

## 2019-01-01 RX ADMIN — ATORVASTATIN CALCIUM 20 MG: 10 TABLET, FILM COATED ORAL at 17:51

## 2019-01-01 RX ADMIN — SENNOSIDES 17.2 MG: 8.6 TABLET, FILM COATED ORAL at 08:36

## 2019-01-01 RX ADMIN — RISPERIDONE 1 MG: 1 TABLET, ORALLY DISINTEGRATING ORAL at 14:21

## 2019-01-01 RX ADMIN — RISPERIDONE 1 MG: 1 TABLET, ORALLY DISINTEGRATING ORAL at 08:10

## 2019-01-01 RX ADMIN — METOPROLOL SUCCINATE 50 MG: 25 TABLET, FILM COATED, EXTENDED RELEASE ORAL at 10:55

## 2019-01-01 RX ADMIN — HYDROCHLOROTHIAZIDE 12.5 MG: 25 TABLET ORAL at 08:36

## 2019-01-01 RX ADMIN — CARBAMAZEPINE 100 MG: 200 TABLET ORAL at 20:30

## 2019-01-01 RX ADMIN — RISPERIDONE 2 MG: 1 TABLET, ORALLY DISINTEGRATING ORAL at 21:04

## 2019-01-01 RX ADMIN — DONEPEZIL HYDROCHLORIDE 5 MG: 5 TABLET, FILM COATED ORAL at 20:30

## 2019-01-01 RX ADMIN — ACETAMINOPHEN 650 MG: 325 TABLET ORAL at 11:42

## 2019-01-01 RX ADMIN — METOPROLOL SUCCINATE 50 MG: 25 TABLET, FILM COATED, EXTENDED RELEASE ORAL at 09:31

## 2019-01-01 RX ADMIN — CARBAMAZEPINE 100 MG: 200 TABLET ORAL at 21:53

## 2019-01-01 RX ADMIN — MELATONIN 3 MG ORAL TABLET 6 MG: 3 TABLET ORAL at 21:40

## 2019-01-01 RX ADMIN — AMIODARONE HYDROCHLORIDE 200 MG: 200 TABLET ORAL at 20:14

## 2019-01-01 ASSESSMENT — PAIN SCALES - GENERAL
PAINLEVEL_OUTOF10: 0
PAINLEVEL_OUTOF10: 4
PAINLEVEL_OUTOF10: 0
PAINLEVEL_OUTOF10: 2
PAINLEVEL_OUTOF10: 3
PAINLEVEL_OUTOF10: 0
PAINLEVEL_OUTOF10: 6

## 2019-01-01 ASSESSMENT — PAIN DESCRIPTION - PAIN TYPE: TYPE: OTHER (COMMENT)

## 2019-01-01 ASSESSMENT — SLEEP AND FATIGUE QUESTIONNAIRES
RESTFUL SLEEP: YES
DIFFICULTY STAYING ASLEEP: NO
DIFFICULTY ARISING: NO
DO YOU USE A SLEEP AID: NO
DO YOU HAVE DIFFICULTY SLEEPING: YES
DO YOU HAVE DIFFICULTY SLEEPING: YES
DO YOU USE A SLEEP AID: COMMENT
DIFFICULTY FALLING ASLEEP: YES
SLEEP PATTERN: DIFFICULTY FALLING ASLEEP
AVERAGE NUMBER OF SLEEP HOURS: 8

## 2019-01-01 ASSESSMENT — LIFESTYLE VARIABLES: HISTORY_ALCOHOL_USE: NO

## 2019-06-15 PROBLEM — F02.818 MIXED ALZHEIMER'S AND VASCULAR DEMENTIA WITH BEHAVIOR DISTURBANCES (HCC): Status: ACTIVE | Noted: 2019-01-01

## 2019-06-15 PROBLEM — F01.518 MIXED ALZHEIMER'S AND VASCULAR DEMENTIA WITH BEHAVIOR DISTURBANCES (HCC): Status: ACTIVE | Noted: 2019-01-01

## 2019-06-15 PROBLEM — G30.9 MIXED ALZHEIMER'S AND VASCULAR DEMENTIA WITH BEHAVIOR DISTURBANCES (HCC): Status: ACTIVE | Noted: 2019-01-01

## 2019-06-15 NOTE — ED PROVIDER NOTES
[address] ED  eMERGENCYdEPARTMENT eNCOUnter      Pt Name: Faisal Dc  MRN: [id number]  Armsjaniegfantonio 2/20/1931  Date of evaluation: 6/14/2019  Provider:Sergio Ellsworth MD    CHIEF COMPLAINT       Chief Complaint   Patient presents with    Altered Mental Status     Pt sent in for threatening staff at [address]    Faisal Dc is a 80 y.o. female who presents to the emergency department with altered mental status. Patient is in a skilled nursing facility, she was yelling and screaming at the nurses and they are concerned that they are no longer equipped to handle her. Patient has no complaints, she says she feels fine aside from pain in her legs which she says she always has. No recent fevers. History is limited by the patient's dementia, she does not remember any of the events from today. Nursing Notes were reviewed. REVIEW OF SYSTEMS       Review of Systems    10 point review of systems was performed and was negative exceptas specifically noted in the HPI. PAST MEDICAL HISTORY     Past Medical History:   Diagnosis Date    Arthritis     CHF (congestive heart failure) ([address]     Fractured skull (HCC) with bleeding on brain    Hyperlipidemia     Hypertension     Macular degeneration of right eye     Pneumonia     Thyroid disease          SURGICAL HISTORY       Past Surgical History:   Procedure Laterality Date    CATARACT REMOVAL Right     CYST REMOVAL Bilateral breasts    HIP FRACTURE SURGERY Left pins inserted    HYSTERECTOMY      PACEMAKER INSERTION      PACEMAKER PLACEMENT      THYROIDECTOMY, PARTIAL           CURRENT MEDICATIONS       Previous Medications    ALBUTEROL (PROVENTIL HFA) 108 (90 BASE) MCG/ACT INHALER    Inhale 2 puffs into the lungs every 6 hours as needed for Wheezing. AMIODARONE (CORDARONE) 200 MG TABLET    Take 200 mg by mouth daily. ATORVASTATIN (LIPITOR) 20 MG TABLET    Take 20 mg by mouth daily.     DABIGATRAN at:  [facility],  [place], [place]   Phone [phone]   URINALYSIS - Abnormal; Notable for the following components:    Leukocyte Esterase, Urine SMALL (*)     All other components within normal limits    Narrative:     Performed at:  [facility],  [place], [place]   Phone [phone]   MICROSCOPIC URINALYSIS - Abnormal; Notable for the following components:    Casts 0-1 Hyaline (*)     WBC, UA 10-20 (*)     All other components within normal limits    Narrative:     Performed at:  [facility],  [place]   Phone [phone]   SALICYLATE LEVEL - Abnormal; Notable for the following components:    Salicylate, Serum <4.1 (*)     All other components within normal limits    Narrative:     Performed at:  [facility],  [place] [facility]   Phone [phone]   ACETAMINOPHEN LEVEL - Abnormal; Notable for the following components:    Acetaminophen Level <5 (*)     All other components within normal limits    Narrative:     Performed at:  [facility],  [place], [place]   Phone [phone]   ETHANOL    Narrative:     Performed at:  [facility],  [place], [place]   Phone [phone]   URINE DRUG SCREEN       All other labs were within normal range or not returned as of this dictation. EMERGENCY DEPARTMENT COURSE and DIFFERENTIAL DIAGNOSIS/MDM:   Vitals:    Vitals:    06/14/19 2001 06/14/19 2234 06/14/19 2305   BP: 139/76 (!) 120/42 (!) 127/52   Pulse: 79 64 62   Resp: 15 12 17   Temp: 98.5 °F (36.9 °C)     TempSrc: Oral     SpO2: 96% 99% 99%   Weight: 186 lb (84.4 kg)     Height: 5' 4\" (1.626 m)         MDM  Patient presented with behavioral disturbance.   Presentation vital signs

## 2019-06-15 NOTE — PLAN OF CARE
No yelling/screaming this shift. Intermittently confused, trying doors occasionally. Calmed further later in shift, took meds. Pleasant and cooperative in the evening. Phone call with daughter seemed to significantly help pt's stress level. Responding well to redirection.

## 2019-06-15 NOTE — ED NOTES
Presenting Problem: Behavioral Disturbances    Appearance/Hygiene:  well-appearing, street clothes, lying in bed, good grooming and good hygiene   Motor Behavior: WNL   Attitude: cooperative  Affect: anxiety   Speech: normal pitch and normal volume  Mood: anxious, irritable and labile   Thought Processes: Patient is a dementia patient  Perceptions: Absent   Thought content:  delusions     Suicidal ideation:  no specific plan to harm self   Homicidal ideation:  none  Orientation: A&Ox2  Memory: impaired recent memory and remote memory  Concentration: Fair    Insight/ judgement: normal insight and judgment      Psychosocial and contextual factors: Increasing behavioral disturbances at [facility]    C-SSRS Summary (including current and past suicidal ideation, plan, intent, and attempts) : CURRENT: Denies  PAST: Denies    Psychiatric History: None    Patient reported diagnosis None    Outpatient services/ Provider: Dr. William Cast PCP    Previous Inpatient Admissions( including location and dates if known): None    Self-injurious/ Self-harm behavior: None    History of violence: No    Current Substance use: No    Trauma identified: No    Access to Firearms: No    ASSESSMENT FOR IMMINENT FUTURE DANGER:      RISK FACTORS:    []  Age <25 or >49   []  Male gender   []  Depressed mood   []  Active suicidal ideation   []  Suicide plan   []  Suicide attempt   []  Access to lethal means   []  Prior suicide attempt   []  Active substance abuse   []  Highly impulsive behaviors   []  Not attending to self-care/ADLs    []  Recent significant loss   []  Chronic pain or medical illness   []  Social isolation   []  History of violence   []  Active psychosis   []  Cognitive impairment    []  No outpatient services in place   []  Medication noncompliance   []  No collateral information to support safety   []  Other    PROTECTIVE FACTORS:  [] Age >25 and <55  [] Female gender   [] Denies depression  [] Denies suicidal ideation  [] Does not have

## 2019-06-15 NOTE — PROGRESS NOTES
RESPIRATORY THERAPY ASSESSMENT    Name:  Melly Liao  Medical Record Number:  [id number]  Age: 80 y.o. Gender: female  : 1931  Today's Date:  6/15/2019  Room:  [facility]-    Assessment     Is the patient being admitted for a COPD or Asthma exacerbation? No   (If yes the patient will be seen every 4 hours for the first 24 hours and then reassessed)    Patient Admission Diagnosis      Allergies  Allergies   Allergen Reactions    Latex Itching    Methylprednisolone Other (See Comments)     Steroid induced psychosis    Citalopram     Daypro [Oxaprozin]     Iodine     Ipratropium-Albuterol     Lovastatin     Pcn [Penicillins]     Sulfa Antibiotics     Symbicort [Budesonide-Formoterol Fumarate]     Tramadol     Vioxx [Rofecoxib]        Minimum Predicted Vital Capacity:     816          Actual Vital Capacity:      1160              Pulmonary History: former smoker  Home Oxygen Therapy:  room air  Home Respiratory Therapy: albuterol mdi prn-hasnt used this year   Current Respiratory Therapy:  Albuterol mdi prn          Respiratory Severity Index(RSI)   Patients with orders for inhalation medications, oxygen, or any therapeutic treatment modality will be placed on Respiratory Protocol. They will be assessed with the first treatment and at least every 72 hours thereafter. The following severity scale will be used to determine frequency of treatment intervention.     Smoking History: Pulmonary Disease or Smoking History, Greater than 15 pack year = 2    Social History  Social History     Tobacco Use    Smoking status: Former Smoker     Packs/day: 0.50     Years: 23.00     Pack years: 11.50    Smokeless tobacco: Never Used   Substance Use Topics    Alcohol use: No    Drug use: No       Recent Surgical History: None = 0  Past Surgical History  Past Surgical History:   Procedure Laterality Date    CATARACT REMOVAL Right     CYST REMOVAL Bilateral breasts    HIP FRACTURE SURGERY Left pins inserted  HYSTERECTOMY      PACEMAKER INSERTION      PACEMAKER PLACEMENT      THYROIDECTOMY, PARTIAL         Level of Consciousness: Alert, Oriented, and Cooperative = 0    Level of Activity: Walking with assistance = 1    Respiratory Pattern: Regular Pattern; RR 8-20 = 0    Breath Sounds: Diminshed bilaterally and/or crackles = 2    Sputum   ,  ,    Cough: Strong, spontaneous, non-productive = 0    Vital Signs   /61   Pulse 63   Temp 98.3 °F (36.8 °C) (Oral)   Resp 18   Ht 5' 4\" (1.626 m)   Wt 206 lb (93.4 kg)   SpO2 95%   BMI 35.36 kg/m²   SPO2 (COPD values may differ): Greater than or equal to 92% on room air = 0    Peak Flow (asthma only): not applicable = 0    RSI: 5-6 = Q4hr PRN (every four hours as needed) for dyspnea        Plan       Goals: medication delivery, mobilize retained secretions, volume expansion and improve oxygenation    Patient/caregiver was educated on the proper method of use for Respiratory Care Devices:  Yes      Level of patient/caregiver understanding able to:   ? Verbalize understanding   ? Demonstrate understanding       ? Teach back        ? Needs reinforcement       ? No available caregiver               ? Other:     Response to education:  Good     Is patient being placed on Home Treatment Regimen? Yes     Does the patient have everything they need prior to discharge? Yes     Comments: patient assessed/interviewed. Chart reviewed. Plan of Care: continue home routine    Electronically signed by Orquidea Handley RCP on 6/15/2019 at 4:04 PM    Respiratory Protocol Guidelines     1. Assessment and treatment by Respiratory Therapy will be initiated for medication and therapeutic interventions upon initiation of aerosolized medication. 2. Physician will be contacted for respiratory rate (RR) greater than 35 breaths per minute. Therapy will be held for heart rate (HR) greater than 140 beats per minute, pending direction from physician.   3. Bronchodilators will be

## 2019-06-16 NOTE — PROGRESS NOTES
Physical Therapy  Spoke with nurse Tiny Low). Patient has been up, moving around and walking with RW on the unit all morning. Patient just returned to bed and is currently sleeping. RN prefers that PT assess patient on 6/17 (vs waking her at this time.) Plan to see patient on 6/17.     Sharon Kumar, PT #[id number]

## 2019-06-16 NOTE — H&P
Daily      PRN Meds: acetaminophen, LORazepam, OLANZapine, OLANZapine, traZODone, aluminum & magnesium hydroxide-simethicone, albuterol sulfate HFA     ROS:    Psychiatric: + for Mood lability, disorientation, sundowning, aggression, paranoia ; Negative for Suicidal ideation  All other systems were reviewed and negative except as previously documented in HPI.     PE:    BP (!) 150/75   Pulse 64   Temp 98 °F (36.7 °C) (Oral)   Resp 16   Ht 5' 4\" (1.626 m)   Wt 206 lb (93.4 kg)   SpO2 94%   BMI 35.36 kg/m²       Motor / Gait: no abnormalities noted, nml tone, no involuntary movements, normal gait and station  Cn: II-XII intact    Mental Status Examination:    Appearance: WF, appears stated age, wearing PJs, fair grooming and hygiene  Behavior/Attitude toward examiner:  Uncooperative and guarded, limited eye contact  Speech:  Repetitive, irritable tone  Mood:  \"You're holding me hostage\"  Affect:  Irritable  Thought processes:  Impoverished and repetitive  Thought Content: Denies SI, no specific HI, but has been quite agresive, +paranoia   Perceptions: Denies AVH, not actively RTIS  Attention: Distractible  Abstraction: [place]  Cognition: Average ROSINA, Alert and oriented to person only, recall severely impaired  Insight: Poor insight   Judgment: Impaired judgment      LAB:   Admission on 06/14/2019   Component Date Value Ref Range Status    Sodium 06/14/2019 136  136 - 145 mmol/L Final    Potassium 06/14/2019 4.0  3.5 - 5.1 mmol/L Final    Chloride 06/14/2019 97* 99 - 110 mmol/L Final    CO2 06/14/2019 28  21 - 32 mmol/L Final    Anion Gap 06/14/2019 11  3 - 16 Final    Glucose 06/14/2019 101* 70 - 99 mg/dL Final    BUN 06/14/2019 21* 7 - 20 mg/dL Final    CREATININE 06/14/2019 1.8* 0.6 - 1.2 mg/dL Final    GFR Non- 06/14/2019 27* >60 Final    Comment: >60 mL/min/1.73m2 EGFR, calc. for ages 25 and older using the  MDRD formula (not corrected for weight), is valid for stable  renal prognosis.     Ralph Nguyen MD  Staff Psychiatrist

## 2019-06-17 PROBLEM — F03.918 DEMENTIA WITH BEHAVIORAL DISTURBANCE: Status: ACTIVE | Noted: 2019-01-01

## 2019-06-17 NOTE — H&P
(LIPITOR) 20 MG tablet Take 20 mg by mouth daily. Yes Historical Provider, MD   amiodarone (CORDARONE) 200 MG tablet Take 200 mg by mouth nightly    Yes Historical Provider, MD   metoprolol tartrate (LOPRESSOR) 50 MG tablet Take 50 mg by mouth 2 times daily 5/2/19   Historical Provider, MD   zolpidem (AMBIEN) 5 MG tablet Take 1 tablet by mouth nightly as needed for Sleep. 2/2/15   Jewel Ontiveros MD   triamcinolone (KENALOG) 0.025 % cream Apply topically 2 times daily to affected area. 2/2/15   Jewel Ontiveros MD   albuterol (PROVENTIL HFA) 108 (90 BASE) MCG/ACT inhaler Inhale 2 puffs into the lungs every 6 hours as needed for Wheezing. 1/25/15   Chemo Carballo MD   dabigatran (PRADAXA) 150 MG capsule Take 150 mg by mouth 2 times daily. Historical Provider, MD       Allergies:  Latex; Methylprednisolone; Citalopram; Daypro [oxaprozin]; Iodine; Ipratropium-albuterol; Lovastatin; Pcn [penicillins]; Sulfa antibiotics; Symbicort [budesonide-formoterol fumarate]; Tramadol; and Vioxx [rofecoxib]    Social History:  The patient currently lives at [facility]    TOBACCO:   reports that she has quit smoking. She has a 11.50 pack-year smoking history. She has never used smokeless tobacco.  ETOH:   reports that she does not drink alcohol. Family History:   Positive as follows:    History reviewed. No pertinent family history.     REVIEW OF SYSTEMS:     Constitutional: Negative for fever   HENT: Negative for sore throat   Eyes: Negative for redness   Respiratory: Negative  for dyspnea, cough   Cardiovascular: Negative for chest pain   Gastrointestinal: Negative for vomiting, diarrhea   Genitourinary: Negative for hematuria   Musculoskeletal: Negative for arthralgias   Skin: Negative for rash   Neurological: Negative for syncope    Hematological: Negative for easy bruising/bleeding   Psychiatric/Behavorial: Per psychiatry team evaluation     PHYSICAL EXAM:    /83   Pulse 83   Temp 98 °F (36.7 °C)   Resp 16 Ht 5' 4\" (1.626 m)   Wt 206 lb (93.4 kg)   SpO2 96%   BMI 35.36 kg/m²   Gen: Elderly female. No distress. Alert. Eyes: PERRL. No sclera icterus. No conjunctival injection. ENT: No discharge. Pharynx clear. Neck:  No Carotid Bruit. Trachea midline. Resp: No accessory muscle use. No crackles. No wheezes. No rhonchi. CV: Regular rate. Regular rhythm. No murmur. No rub. 1+ edema. Pacemaker in left upper chest wall   GI: Non-tender. Non-distended. Normal bowel sounds. Skin: Warm and dry. Multiple areas of ecchymosis to BUEs and chronic skin changes to BLE  M/S: No cyanosis. No joint deformity. No clubbing. Neuro: Awake. No focal neurologic deficit on exam.  Follows commands well. No focal deficit. Oriented to self only. Thinks it is 12 and she lives with her    Psych: Per psychiatry team evaluation     CBC:   Recent Labs     06/14/19 2015   WBC 5.0   HGB 12.8   HCT 37.7   MCV 97.4        BMP:   Recent Labs     06/14/19 2015      K 4.0   CL 97*   CO2 28   BUN 21*   CREATININE 1.8*     LIVER PROFILE:   Recent Labs     06/14/19 2015   AST 20   ALT 10   BILITOT 0.5   ALKPHOS 92     UA:  Recent Labs     06/14/19  2233   COLORU Yellow   PHUR 6.0  7.0   LABCAST 0-1 Hyaline*   WBCUA 10-20*   RBCUA 0-2   CLARITYU Clear   SPECGRAV 1.010   LEUKOCYTESUR SMALL*   UROBILINOGEN 0.2   BILIRUBINUR Negative   BLOODU Negative   GLUCOSEU Negative        Ethanol Lvl None Detected      Salicylate, Serum <0.0TKR       Acetaminophen Level <5Low       Hemoglobin A1C 4.6       UDS: negative     CULTURES  None    EKG:  I have reviewed the EKG with the following interpretation:   Atrial paced rhythm, RSR' or QR pattern in V1 suggests right ventricular conduction delay, Prolonged QT (475), no acute ST segment changes     RADIOLOGY  CT Head WO Contrast   Final Result   No acute intracranial abnormality.              Pertinent previous results reviewed   Echo 11/2017    CONCLUSIONS     Left ventricular cavity

## 2019-06-17 NOTE — PROGRESS NOTES
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 Treatment Time:  930-1050  Treatment Number: 1   Billable Treatment Time: 70 minutes   Total Treatment Time:   80   minutes    Patient Goals for Therapy:  \" to go home \"      Discharge Recommendations: LTC/ECF  DME needs for discharge: defer to facility       Therapy recommendations for staff:   Supervision, with [facility] S4 Level Recommendation:  NA  AM-PAC Score: 16    Aamir Cognitive Assessment (MOCA)  (See pt folder behind nurses station for copy of assessment while pt is admitted.)   Total Score: Sum of all subscores listed on the right-hand side. Add one point for an individual who has 12 years or fewer of formal education, for a possible maximum of 30 points. A final total score of 26 and above is considered normal.     Education Level 8th     Alternating Anita Making 0   Visuoconstructional Skills (cube)  0   Cisuoconstructional Skills (clock)  0   Naming 0   Attention 1   Sentence Repetition  1   Verbal Fluency  0   Abstraction  0   Delayed Recall 0   Orientation  1   Total Score (additional point for <12 grade educations)  4     Of Note: Pt reports macular degeneration which may have impacted the following subsections: alternating trail making, visuoconstructional skills, and naming. Pt benefited from extended time and encouragement to complete the assessment. Pt with negative self talk throughout stating \"I'm dumb. \"     Preadmission Environment    Resides at

## 2019-06-17 NOTE — PROGRESS NOTES
Alert and oriented to self. Compliant with medications. Patient stated that she was unsure about taking her pm meds but accepted them easily a few minutes later. Has slept well this shift. Denies SI/HI/AVH. Will continue to monitor.

## 2019-06-17 NOTE — GROUP NOTE
Group Therapy Note    Date: June 17    Group Start Time: 1000  Group End Time: 1050  Group Topic: Cognitive Skills (Music Therapy)    [facility]        Group Therapy Note    Attendees: 5    Patient's Goal: to engage in reminiscing about rainy days, listen to rainy day music, and socialize with peers to improve mood, stimulate memory recall, and improve overall quality of life. Notes: Florentin Alcantara was excused from the first 20 minutes of group due to meeting with OT staff. Pt engaged in reminiscing regarding rainy days. Florentin Alc[place]ara [place] Pt stated enjoyment of music utilized and group discussion. Status After Intervention:  Improved    Participation Level:  Active Listener and Interactive    Participation Quality: Appropriate, Attentive and Sharing      Speech:  normal      Thought Process/Content: confused      Affective Functioning: Flat      Mood: euthymic      Level of consciousness:  Alert and Attentive      Response to Learning: Able to verbalize current knowledge/experience      Endings: None Reported    Modes of Intervention: Support, Socialization, Activity and Media      Discipline Responsible: Psychoeducational Specialist      Signature:  ZAIRA Recinos

## 2019-06-17 NOTE — PROGRESS NOTES
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]  Admit Date:  6/14/2019  Precautions/Restrictions/WB Status/ Lines/ Wounds/ Oxygen: fall risk, general BHI precautions, up as tolerated    Treatment Time:  7885-9756  Treatment Number:  1   Timed Code Treatment Minutes: 12 minutes  Total Treatment Minutes:  22  minutes    Patient Goals for Therapy: Not stated          Discharge Recommendations: LTC/ECF  DME needs for discharge: defer to facility       Therapy recommendations for staff:   Supervision with use of rolling walker (RW) for all ambulation within community room    [facility] S4 Level Recommendation:  NA  AM-PAC Mobility Score    AM-PAC Inpatient Mobility Raw Score : 20       Preadmission Environment    Resides at [facility].      Preadmission Status / PLOF:  Walks with a walker  Pt reports she moved to a NH 2 weeks ago with her . Pain   Yes  Location: L hip  Rating: mild   Pain Medicine Status: Denies need    Cognition    A&O Person    Able to follow 1 step commands    Subjective  Patient sitting on EOB with no family present  Pt agreeable to this PT eval & tx. Upper Extremity ROM/Strength  Please see OT evaluation.       Lower Extremity ROM / Strength    AROM WFL: Yes    Strength Assessment (measured on a 0-5 scale):  R LE   Quad   5/5   Ant Tib  5/5   Hamstring 5/5   Iliopsoas 4  L LE  Quad   4   Ant Tib  5/5   Hamstring 5/5   Iliopsoas 4    Lower Extremity Sensation    WFL    Lower Extremity Proprioception:   WFL    Coordination and Tone  WFL    Balance  Static Sitting:  Good    Tolerance:   Dynamic Sitting:  Good   Static Standing: Good -    Tolerance:   Dynamic Standing: Fair +    Bed Mobility   Supine to Sit: be seen 2-3x/wk while in acute care setting for therapeutic exercises, bed mobility, transfers, progressive gait training, balance training, and family/patient education. Ana Hale PT, DPT #[id number]    If patient discharges from this facility prior to next visit, this note will serve as the Discharge Summary.

## 2019-06-18 NOTE — PLAN OF CARE
Lisa Parson has been visible on the unit. She is alert and oriented only to self. Irritable at times. Wandering into the wrong room @ times. Denies SI/HI/AVH but possibly responding to internal stimuli. Anxious. Commented that there were \"bugs\" on her bed but no bugs visible. Med compliant. Will continue to monitor.

## 2019-06-18 NOTE — PROGRESS NOTES
[facility]AR[facility]  Day 3 Interdisciplinary Treatment Plan NOTE    Review Date & Time: 06/18/19 939    Patient was not in treatment team    Admission Type:   Admission Type: Involuntary    Reason for admission:  Reason for Admission: Patient admitted from the ER after being transferred from [facility] d/t increased aggressive behavior/combative toward staff/mental status changes/confusion  Estimated Length of Stay Update:  3-5 days  Estimated Discharge Date Update: 06/21-06/23    PATIENT STRENGTHS:  Patient Strengths Strengths: Positive Support  Patient Strengths and Limitations:Limitations: Difficult relationships / poor social skills  Addictive Behavior:Addictive Behavior  In the past 3 months, have you felt or has someone told you that you have a problem with:  : None  Do you have a history of Chemical Use?: No  Do you have a history of Alcohol Use?: No  Do you have a history of Street Drug Abuse?: No  Histroy of Prescripton Drug Abuse?: No  Medical Problems:  Past Medical History:   Diagnosis Date    Arthritis     CHF (congestive heart failure) (HCC)     Fractured skull (HCC) with bleeding on brain    Hyperlipidemia     Hypertension     Macular degeneration of right eye     Pneumonia     Thyroid disease        Risk:  Fall RiskTotal: 129  Italo Scale Italo Scale Score: 19  BVC Total: 2  Change in scores no.  Changes to plan of Care  no    Status EXAM:   Status and Exam  Normal: No  Facial Expression: Worried  Affect: Congruent  Level of Consciousness: Confused  Mood:Normal: No  Mood: Depressed, Anxious, Irritable  Motor Activity:Normal: Yes  Motor Activity: Other(See Comments)(pacing at times)  Interview Behavior: Cooperative  Preception: [place] to Person  Attention:Normal: No  Attention: Distractible  Thought Processes: Circumstantial  Thought Content:Normal: No  Thought Content: Preoccupations  Hallucinations: None  Delusions: Yes  Delusions: Persecution  Memory:Normal: No  Memory: Confabulation,

## 2019-06-18 NOTE — PROGRESS NOTES
behavioral disturbance under control, sleeping well, mood improved/stable, eating well, aftercare arranged. Total face to face time with patient was 60 minutes and more than 50 % of that time was spent counseling the patient on their symptoms, treatment and expected goals.     Alberta Whalen MD  Staff Psychiatrist

## 2019-06-18 NOTE — PROGRESS NOTES
intact    Mental Status Examination:    Appearance: WF, appears stated age, wearing PJs, fair grooming and hygiene  Behavior/Attitude toward examiner:  Minimally cooperative and overall guarded, limited eye contact  Speech:  Repetitive, irritable tone  Mood:  \"I want to go home\"  Affect:  Irritable and anxious  Thought processes:  Impoverished and repetitive  Thought Content: Denies SI, no specific HI, +paranoia, +confabulation  Perceptions: Denies AVH, not actively RTIS  Attention: Distractible  Abstraction: [place]  Cognition: Average ROSINA, Alert and oriented to person only, recall severely impaired  Insight: Poor insight   Judgment: Impaired judgment      LAB: Reviewed labs from last 24 hours      Assessment and Plan:    Diagnoses:   Primary Psychiatric (DSM V) Diagnosis: Major neurocognitive disorder, due to multiple etiologies, severe with behavioral disturbance  Secondary Psychiatric (DSM V) Diagnoses: None  Chemical Dependency Diagnoses: None known  Active Medical Diagnoses: Hypertension, hyperlipidemia, A fib    All conditions detailed above are being treated while patient is hospitalized. Tx plan: Generally: prevent self injury/aggression, stabilize mood/anxiety/psychotic/behavioral disturbance, establish/maintain aftercare, increase coping mechanisms, improve medication compliance. All conditions present on admission are being treated while pt is hospitalized. Legal Status: Voluntary via healthcare power of     Primary Psychiatric Issues:  1. Dementia with behavioral disturbance:  -Continue carbamazepine 100 mg b.i.d., Continue risperidone 1 mg PO b.i.d.  -Attempted to reach pt's daughter today by phone to get more collateral, but unable to reach her. Left message. Chemical Dependency Issues:  No issues.       Function:  -Consulted physical therapy - recommend ECF  -Consulted occupational therapy - recommend ECF  -Falls precautions    Medical Problems:  Internal medicine has been

## 2019-06-18 NOTE — PROGRESS NOTES
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 \" Pt unable to be consoled at this time. Will f/u as time allows and pt is agreeable. Thank you.      Serjio Thornton, OTR/L   [id number]

## 2019-06-19 NOTE — GROUP NOTE
Group Therapy Note    Date: June 19    Group Start Time: 1000  Group End Time: 1020  Group Topic: [id number]        Group Therapy Note    Attendees: 4    Notes: Migdalia Beck appeared to be drowsy during group. Migdalia Beck required moderate prompting and encouragement to set a daily goal. Migdalia Beck shared with peers her goal is \"to have a good day. \"    Status After Intervention:  Improved    Participation Level:  Active Listener and Interactive    Participation Quality: Appropriate, Attentive and Sharing      Speech:  normal      Thought Process/Content: Flight of ideas      Affective Functioning: Congruent      Mood: euthymic      Level of consciousness: Confused      Response to Learning: Progressing to goal      Endings: None Reported    Modes of Intervention: Education, Support, Socialization, Exploration, Clarifying, Problem-solving and Activity      Discipline Responsible: Psychoeducational Specialist      Signature:  Jayy Her

## 2019-06-20 NOTE — PLAN OF CARE
Patient resting quietly in bed with eyes closed. Cooperative with care. Complaint with medications. No behavioral outbursts noted thus far. Fall precautions in place, bed alarm on, nonskid foot wear applied, bed in lowest position, wheels locked and call light/personal items within reach. Patient free from falls this shift, will continue to monitor.

## 2019-06-20 NOTE — PROGRESS NOTES
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 Pt in good spirits, smiling throughout session. Pain   None    Bed Mobility:  DNT, pt seated in community room upon entry and return at conclusion. Transfer Training:   Sit to stand:   Supervision and with use of RW  Stand to sit:  Supervision and with use of RW  Bed to Chair:  Supervision and with use of RW  Bed to [facility]:   Not Tested  Standard toilet:   Supervision and with use of RW    Activity Tolerance   Pt completed therapy session with No adverse symptoms    ADL Training:   Upper body dressing:  Supervision to don/doff personal jacket   Upper body bathing:  Not Tested  Lower body dressing:  Supervision to manage pants and depends for toileting   Lower body bathing:  Not Tested  Toileting:   Supervision  Grooming/Hygiene:  Supervision to wash hands at sink  Eating:   Pam for set up of some food items and opening some packets. Therapeutic Exercise: Through functional activities     Patient Education:   Role of OT    Positioning Needs:   Seated in community room eating breakfast.     Family Present:  No    Assessment: Pt with good progress this date. Pt appears to be clearing and improving in cognition.  Pt able to remain on topic

## 2019-06-22 NOTE — PROGRESS NOTES
Department of Psychiatry  Attending Progress Note    Admission Date:    6/14/2019    Chief complaint / Reason for Admission:  Aggressive behavioral disturbance    Patient's chart was reviewed, case was discussed with nursing/OT/RT staff, and collaborated with  about the treatment plan. SUBJECTIVE:   Over last 24 hours:  Behavioral outbursts: No   Non-aggressive behavioral disturbance: Yes wandering  Medication compliant: Yes  Need for seclusion/restraints: No  Sleeping adequately:  Yes  Appetite adequate: Yes  Attending groups: Yes    On today's interview:   Pt overall doing better. Still oriented to self only and focused on wanting to go home. More redirectable, however. Sleeping better. PASRR pending at this time. Working to ID an appropriate facility for patient.       Progressing overall: Some progressive improvement relative to first day of admission  Suicidal ideation: Denies  Homicidal ideation: Denies  Medication side effects: No    ROS: Patient has new complaints: no    Current Medications Ordered:   melatonin  6 mg Oral Nightly    donepezil  5 mg Oral Nightly    carBAMazepine  100 mg Oral BID    risperiDONE  1 mg Oral BID    metoprolol succinate  50 mg Oral Daily    senna  2 tablet Oral Daily    amiodarone  200 mg Oral Nightly      PRN Meds: acetaminophen, LORazepam, OLANZapine, OLANZapine, traZODone, aluminum & magnesium hydroxide-simethicone, albuterol sulfate HFA     Objective:     PE:    /68   Pulse 63   Temp 97.9 °F (36.6 °C) (Oral)   Resp 17   Ht 5' 4\" (1.626 m)   Wt 206 lb (93.4 kg)   SpO2 92%   BMI 35.36 kg/m²       Motor / Gait: no abnormalities noted, nml tone, no involuntary movements, normal gait and station - aided by walker  Cn: II-XII intact    Mental Status Examination:    Appearance: WF, appears stated age, wearing PJs, fair grooming and hygiene  Behavior/Attitude toward examiner: More cooperative, limited eye contact  Speech:  Normal rate, volume, dose of atorvastatin 20 mg daily. 3. Hypertension:  -continue home dose of hydrochlorothiazide 12.5 mg daily, continue home dose of metoprolol XL 50 mg daily    Code Status: DNR-CC, confirmed with POA on 6/18. Disposition:    -Housing: NH - evidently a new referral will need to be placed  -Current outpatient follow-up: NH - new referral needed  -discharge planning incomplete    Prognosis:  Long-term prognosis is poor     Criteria for Discharge:  Not psychotic, not homicidal, not suicidal, behavioral disturbance under control, sleeping well, mood improved/stable, eating well, aftercare arranged. Total face to face time with patient was 60 minutes and more than 50 % of that time was spent counseling the patient on their symptoms, treatment and expected goals.     Edenilson Jeffers MD  Staff Psychiatrist

## 2019-06-22 NOTE — PLAN OF CARE
Pt was up ad sayda in the common area. Gait slow and steady with assist of walker. She is oriented to self only. She is confused and forgetful with repetitive statements. She states she is ready to go home. She states she better go soon, so she doesn't have to drive after dark. Unable to reorient, but redirects easily. She denies any SI/HI/AVH. She has made no bizarre statements about babies.

## 2019-06-23 NOTE — PROGRESS NOTES
Department of Psychiatry  Attending Progress Note  Chief Complaint: dementia  Marni Bruce was in bed and was lying with her head at the foot of the bed. SHe did respond to questions and kept asking if her  was present. She stated that her  had just been here with her mother and father. Patient's chart was reviewed and collaborated with  about the treatment plan. SUBJECTIVE:    Patient is feeling unchanged. Suicidal ideation:  denies suicidal ideation. Patient does not have medication side effects. ROS: Patient has new complaints: no  Sleeping adequately:  Yes   Appetite adequate: No:   Attending groups: No:  Visitors:No    OBJECTIVE    Physical  VITALS:  BP (!) 149/87   Pulse 78   Temp 97.9 °F (36.6 °C) (Oral)   Resp 16   Ht 5' 4\" (1.626 m)   Wt 206 lb (93.4 kg)   SpO2 95%   BMI 35.36 kg/m²     Mental Status Examination:  Patients appearance was ill-appearing. Thoughts are Illogical. Homicidal ideations none. No abnormal movements, tics or mannerisms. Memory impaired Aims 0. Concentration Fair. Alert and oriented X 4. Insight and Judgement impaired insight. Patient was cooperative. Patient gait did not walk.  Mood constricted, affect flat affect Hallucinations KEITH, suicidal ideations no specific plan to harm self Speech increased latency of response  Data  Labs:   Admission on 06/14/2019   Component Date Value Ref Range Status    Sodium 06/14/2019 136  136 - 145 mmol/L Final    Potassium 06/14/2019 4.0  3.5 - 5.1 mmol/L Final    Chloride 06/14/2019 97* 99 - 110 mmol/L Final    CO2 06/14/2019 28  21 - 32 mmol/L Final    Anion Gap 06/14/2019 11  3 - 16 Final    Glucose 06/14/2019 101* 70 - 99 mg/dL Final    BUN 06/14/2019 21* 7 - 20 mg/dL Final    CREATININE 06/14/2019 1.8* 0.6 - 1.2 mg/dL Final    GFR Non- 06/14/2019 27* >60 Final    Comment: >60 mL/min/1.73m2 EGFR, calc. for ages 25 and older using the  MDRD formula (not corrected for weight), is valid MD, Halle Menendez ([id number]) on 6/16/2019 12:43:36 AM   Final    Hemoglobin A1C 06/16/2019 4.6  See comment % Final    Comment: Comment:  Diagnosis of Diabetes: > or = 6.5%  Increased risk of diabetes (Prediabetes): 5.7-6.4%  Glycemic Control: Nonpregnant Adults: <7.0%                    Pregnant: <6.0%        eAG 06/16/2019 85.3  mg/dL Final    Cholesterol, Total 06/16/2019 139  0 - 199 mg/dL Final    Triglycerides 06/16/2019 72  0 - 150 mg/dL Final    HDL 06/16/2019 76* 40 - 60 mg/dL Final    LDL Calculated 06/16/2019 49  <100 mg/dL Final    VLDL Cholesterol Calculated 06/16/2019 14  Not Established mg/dL Final            Medications  Current Facility-Administered Medications: melatonin tablet 6 mg, 6 mg, Oral, Nightly  donepezil (ARICEPT) tablet 5 mg, 5 mg, Oral, Nightly  acetaminophen (TYLENOL) tablet 650 mg, 650 mg, Oral, Q4H PRN  LORazepam (ATIVAN) tablet 0.5 mg, 0.5 mg, Oral, Q4H PRN  OLANZapine (ZYPREXA) tablet 2.5 mg, 2.5 mg, Oral, Q6H PRN  OLANZapine (ZYPREXA) injection 5 mg, 5 mg, Intramuscular, BID PRN  traZODone (DESYREL) tablet 25 mg, 25 mg, Oral, Nightly PRN  aluminum & magnesium hydroxide-simethicone (MAALOX) 200-200-20 MG/5ML suspension 30 mL, 30 mL, Oral, Q6H PRN  carBAMazepine (TEGRETOL) tablet 100 mg, 100 mg, Oral, BID  risperiDONE (RISPERDAL M-TABS) disintegrating tablet 1 mg, 1 mg, Oral, BID  metoprolol succinate (TOPROL XL) extended release tablet 50 mg, 50 mg, Oral, Daily  senna (SENOKOT) tablet 17.2 mg, 2 tablet, Oral, Daily  albuterol sulfate  (90 Base) MCG/ACT inhaler 2 puff, 2 puff, Inhalation, Q6H PRN  amiodarone (CORDARONE) tablet 200 mg, 200 mg, Oral, Nightly    ASSESSMENT AND PLAN    Active Problems:    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

## 2019-06-23 NOTE — PLAN OF CARE
Pt up in the common area with walker, gait steady. She remains confused. Alert to self only. Was looking for her . When she laid down she thought she had a baby in her bed and wanted writer to [employer] it up\". Reoriented without success, but easily redirected. She is pleasant and denies SI/HI/AVH.

## 2019-06-24 NOTE — PLAN OF CARE
Fall precautions in place, bed alarm on, nonskid foot wear applied, bed in lowest position, wheels locked and call light/personal items within reach. Patient free from falls this shift, will continue to monitor.

## 2019-06-25 NOTE — PROGRESS NOTES
benefit from continued skilled occupational therapy while in the hospital in order to progress to safe and more indpt functioning. GOALS  To be met in 3 Visits:  1). Bed to toilet: Supervision  (Goal met 6/20/19) New Goal: Indpt with LRAD      To be met in 5 Visits:  1). Supine to Sit: Supervision  2). Upper Body Bathing:  Min A    3). Lower Body Bathing:  Min A  4). Upper Body Dressing: Min A  (Goal met 6/20/19)  New Goal: Indpt   5). Lower Body Dressing: Min A (Goal met 6/24/19) New Goal: Supervision   6).  Pt to francheska UE exs x 15 reps      Plan: cont with POC    Dallas Joshua OTR/L [id number]      If patient discharges from this facility prior to next visit, this note will serve as the Discharge Summary

## 2019-06-25 NOTE — GROUP NOTE
Group Therapy Note    Date: June 25    Group Start Time: 0900  Group End Time: 0915  Group Topic: [address] [place] [address]        Group Therapy Note    Attendees: 3    Patient's Goal: to actively listen to unit guidelines and set a daily goal.    Notes: Karissa Garcia actively listened to unit guidelines. Karissa Garcia required minimal prompts of encouragement to set a daily goal. Karissa Garcia shared with peers her daily goal is \"to listen to country music. \"     Status After Intervention:  Improved    Participation Level:  Active Listener and Interactive    Participation Quality: Appropriate, Attentive and Sharing      Speech:  normal      Thought Process/Content: Flight of ideas      Affective Functioning: Congruent      Mood: euthymic      Level of consciousness:  Alert, Confused      Response to Learning: Capable of insight, Able to change behavior and Progressing to goal      Endings: None Reported    Modes of Intervention: Education, Support, Socialization, Exploration, Clarifying, Problem-solving and Activity      Discipline Responsible: Psychoeducational Specialist      Signature:  Grace Gallegos, [address]

## 2019-06-25 NOTE — PLAN OF CARE
Problem: Falls - Risk of:  Goal: Will remain free from falls  Description  Will remain free from falls  6/25/2019 0908 by Ludivina Waller RN  Outcome: Ongoing  Note:   Non skid footwear in place. Bed in low locked position. Bed alarm on. Pt with slow steady gait uses walker to ambulate. Problem: Risk for Impaired Skin Integrity  Goal: Tissue integrity - skin and mucous membranes  Description  Structural intactness and normal physiological function of skin and  mucous membranes. 6/25/2019 0908 by Ludivina Waller RN  Outcome: Ongoing  Note:   Patient will be encouraged to change position frequently and assisted when needed. Pt with skin tear to RUE. Problem: Pain:  Goal: Pain level will decrease  Description  Pain level will decrease  6/25/2019 0908 by Ludivina Waller RN  Outcome: Ongoing  Note:   Patient will verbalize pain needs and rate pain using 0/10 scale. Problem: Daily Care:  Goal: Daily care needs are met  Description  Daily care needs are met  Outcome: Ongoing  Note:   Patient will be encouraged to take part in daily care and assisted when needed.

## 2019-06-26 NOTE — PROGRESS NOTES
\"Tired\"  Affect:  Constricted  Thought processes:  Perseverative and impoverished  Thought Content: Denies SI, no specific HI, no overt paranoia, +confabulation  Perceptions: Denies AVH, not actively RTIS  Attention: Distractible  Abstraction: [place]  Cognition: Average ROSINA, Alert and oriented to person only, recall severely impaired  Insight: Poor insight   Judgment: Impaired judgment      LAB: Reviewed labs from last 24 hours      Assessment and Plan:    Diagnoses:   Primary Psychiatric (DSM V) Diagnosis: Major neurocognitive disorder, due to multiple etiologies, severe with behavioral disturbance  Secondary Psychiatric (DSM V) Diagnoses: None  Chemical Dependency Diagnoses: None known  Active Medical Diagnoses: Hypertension, hyperlipidemia, A fib    All conditions detailed above are being treated while patient is hospitalized. Tx plan: Generally: prevent self injury/aggression, stabilize mood/anxiety/psychotic/behavioral disturbance, establish/maintain aftercare, increase coping mechanisms, improve medication compliance. All conditions present on admission are being treated while pt is hospitalized. Legal Status: Voluntary via healthcare power of     Primary Psychiatric Issues:  1. Dementia with behavioral disturbance:  -Continue carbamazepine 100 mg b.i.d.,   -Continue risperidone 1 mg daily, 2 mg qhs. Black box warning related to antipsychotic use in dementia discussed with POA. -Continue donepezil 5 mg daily for dementia (mixed AD, vascular pathology)  -Melatonin and trazodone prn    Chemical Dependency Issues:  No issues. Function:  -Consulted physical therapy - recommend ECF  -Consulted occupational therapy - recommend ECF  -Falls precautions    Medical Problems:  Internal medicine has been consulted. Appreciate recs. 1.  Afib:  -Continue home dose of amiodarone 200 mg nightly.  -Continue baby aspirin 81 mg daily    2.  Hyperlipidemia:  -continue home dose of atorvastatin 20 mg

## 2019-06-26 NOTE — PLAN OF CARE
Pt was in bed at the start of the shift. She has been sleeping most of the time. She awakens easily for medications. She declines snack and group. She is confused to time, place and situation. She continues to ask for the baby to be covered up or ask if the baby is alright. Reoriented to the fact that there is no baby in the bed, and she states \" Well, I have been worried about it all night for nothing\". She asks about the baby again after a few minutes.

## 2019-06-27 NOTE — GROUP NOTE
Group Therapy Note    Date: June 27    Group Start Time: 1345  Group End Time: 1510  Group Topic: [phone] [facility]        Group Therapy Note    Attendees: 4    Patient's Goal: to actively listen to music utilized, make choices of preferred music, and discuss enjoyable activities to maintain a peaceful holding environment, increase autonomy, improve mood, promote engagement in leisure activities, and improve overall quality of life. Notes: Wojciech Palafox was approximately 10 minutes late to group. Pt actively engaged in group activity throughout. Wojciech Palafox made multiple choices of preferred music and sang along. Pt discussed family life and Jainism beliefs, expressing she enjoys going to [place] every Sunday with her family. Wojciech Palafox shared she enjoys \"singing, cooking, cleaning, and spending time with people I care about. \" Pt expressed enjoyment of activity and was smiling and laughing with ZAIRA at conclusion of group, stating \"I had such a good time. Thank you so much. \"    Status After Intervention:  Improved    Participation Level:  Active Listener and Interactive    Participation Quality: Appropriate, Attentive and Sharing      Speech:  normal      Thought Process/Content: Linear      Affective Functioning: Congruent      Mood: euthymic and bright      Level of consciousness:  Alert and Attentive      Response to Learning: Able to verbalize current knowledge/experience and Progressing to goal      Endings: None Reported    Modes of Intervention: Education, Support, Socialization, Problem-solving, Activity and Media      Discipline Responsible: Psychoeducational Specialist      Signature:  ZAIRA Stewart

## 2019-06-27 NOTE — PLAN OF CARE
Patient relaxed visible on unit for meals and am group Med compliant Oriented x1 reassurance given resting in bed will monitor

## 2019-06-27 NOTE — PROGRESS NOTES
Department of Psychiatry  Attending Progress Note    Admission Date:    6/14/2019    Chief complaint / Reason for Admission:  Aggressive behavioral disturbance    Patient's chart was reviewed, case was discussed with nursing/OT/RT staff, and collaborated with  about the treatment plan. SUBJECTIVE:   Over last 24 hours:  Behavioral outbursts: No   Non-aggressive behavioral disturbance: Mild  Medication compliant: Yes  Need for seclusion/restraints: No  Sleeping adequately:  Yes  Appetite adequate: Yes  Attending groups: Yes    On today's interview:   No significant issues in last 24 hours. Did not require PRN medications last 24 hours. Kept to herself in room a bit more yesterday than usual, but we did have a rather disruptive new admission yesterday. Did not mention delusion/VH of baby in her room yesterday to staff. On interview, remains oriented to self only, confused, and confabulatory. No discomfort.     Progressing overall: Improved  Suicidal ideation: Denies  Homicidal ideation: Denies  Medication side effects: No    ROS: Patient has new complaints: no    Current Medications Ordered:   risperiDONE  1 mg Oral Daily    risperiDONE  2 mg Oral Nightly    melatonin  6 mg Oral Nightly    donepezil  5 mg Oral Nightly    carBAMazepine  100 mg Oral BID    metoprolol succinate  50 mg Oral Daily    senna  2 tablet Oral Daily    amiodarone  200 mg Oral Nightly      PRN Meds: acetaminophen, LORazepam, OLANZapine, OLANZapine, traZODone, aluminum & magnesium hydroxide-simethicone, albuterol sulfate HFA     Objective:     PE:    BP (!) 154/79   Pulse 60   Temp 97.6 °F (36.4 °C) (Oral)   Resp 18   Ht 5' 4\" (1.626 m)   Wt 206 lb (93.4 kg)   SpO2 98%   BMI 35.36 kg/m²       Motor / Gait: no abnormalities noted, nml tone, no involuntary movements, normal gait and station - aided by walker  Cn: II-XII intact    Mental Status Examination:    Appearance: WF, appears stated age, wearing PJs, fair

## 2019-06-28 NOTE — PROGRESS NOTES
volume, tone, overall poverty  Mood:  Neutral   Affect:  Constricted  Thought processes:  Perseverative and impoverished  Thought Content: Denies SI, no specific HI, no overt paranoia, +confabulation  Perceptions: Denies AVH, not actively RTIS  Attention: Distractible  Abstraction: [place]  Cognition: Average ROSINA, Alert and oriented to person only, recall severely impaired  Insight: Poor insight   Judgment: Impaired judgment      LAB: Reviewed labs from last 24 hours      Assessment and Plan:    Diagnoses:   Primary Psychiatric (DSM V) Diagnosis: Major neurocognitive disorder, due to multiple etiologies, severe with behavioral disturbance  Secondary Psychiatric (DSM V) Diagnoses: None  Chemical Dependency Diagnoses: None known  Active Medical Diagnoses: Hypertension, hyperlipidemia, A fib    All conditions detailed above are being treated while patient is hospitalized. Tx plan: Generally: prevent self injury/aggression, stabilize mood/anxiety/psychotic/behavioral disturbance, establish/maintain aftercare, increase coping mechanisms, improve medication compliance. All conditions present on admission are being treated while pt is hospitalized. Legal Status: Voluntary via healthcare power of     Primary Psychiatric Issues:  1. Dementia with behavioral disturbance:  -Continue carbamazepine 100 mg b.i.d.,   -Continue risperidone 1 mg daily, 2 mg qhs. Black box warning related to antipsychotic use in dementia discussed with POA. -Continue donepezil 5 mg daily for dementia (mixed AD, vascular pathology)  -Melatonin and trazodone prn    Chemical Dependency Issues:  No issues. Function:  -Consulted physical therapy - recommend ECF  -Consulted occupational therapy - recommend ECF  -Falls precautions    Medical Problems:  Internal medicine has been consulted. Appreciate recs. 1.  Afib:  -Continue home dose of amiodarone 200 mg nightly.  -Continue baby aspirin 81 mg daily    2.

## 2019-06-30 NOTE — PROGRESS NOTES
CREATININE 06/14/2019 1.8* 0.6 - 1.2 mg/dL Final    GFR Non- 06/14/2019 27* >60 Final    Comment: >60 mL/min/1.73m2 EGFR, calc. for ages 25 and older using the  MDRD formula (not corrected for weight), is valid for stable  renal function.  GFR  06/14/2019 32* >60 Final    Comment: Chronic Kidney Disease: less than 60 ml/min/1.73 sq.m. Kidney Failure: less than 15 ml/min/1.73 sq.m. Results valid for patients 18 years and older.  Calcium 06/14/2019 9.1  8.3 - 10.6 mg/dL Final    Total Protein 06/14/2019 6.6  6.4 - 8.2 g/dL Final    Alb 06/14/2019 3.7  3.4 - 5.0 g/dL Final    Albumin/Globulin Ratio 06/14/2019 1.3  1.1 - 2.2 Final    Total Bilirubin 06/14/2019 0.5  0.0 - 1.0 mg/dL Final    Alkaline Phosphatase 06/14/2019 92  40 - 129 U/L Final    ALT 06/14/2019 10  10 - 40 U/L Final    AST 06/14/2019 20  15 - 37 U/L Final    Comment: Specimen hemolysis has exceeded the interference as defined by Roche. Value may be falsely increased. Suggest reorder and recollection if  clinically indicated.       Globulin 06/14/2019 2.9  g/dL Final    WBC 06/14/2019 5.0  4.0 - 11.0 K/uL Final    RBC 06/14/2019 3.87* 4.00 - 5.20 M/uL Final    Hemoglobin 06/14/2019 12.8  12.0 - 16.0 g/dL Final    Hematocrit 06/14/2019 37.7  36.0 - 48.0 % Final    MCV 06/14/2019 97.4  80.0 - 100.0 fL Final    MCH 06/14/2019 33.2  26.0 - 34.0 pg Final    MCHC 06/14/2019 34.1  31.0 - 36.0 g/dL Final    RDW 06/14/2019 14.9  12.4 - 15.4 % Final    Platelets 02/84/7081 159  135 - 450 K/uL Final    MPV 06/14/2019 8.3  5.0 - 10.5 fL Final    Neutrophils % 06/14/2019 74.9  % Final    Lymphocytes % 06/14/2019 15.0  % Final    Monocytes % 06/14/2019 8.0  % Final    Eosinophils % 06/14/2019 1.4  % Final    Basophils % 06/14/2019 0.7  % Final    Neutrophils # 06/14/2019 3.7  1.7 - 7.7 K/uL Final    Lymphocytes # 06/14/2019 0.7* 1.0 - 5.1 K/uL Final    Monocytes # 06/14/2019 0.4  0.0 - 1.3

## 2019-07-02 NOTE — PROGRESS NOTES
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]  Admit Date:  6/14/2019  Precautions/Restrictions:  fall risk, general BHI precautions, up as tolerated       Discharge Recommendations: LTC w/ PT   DME needs for discharge: defer to facility       Therapy recommendations for staff:   Supervision/SBA with use of rolling walker (RW) for all ambulation within community room    [facility] S4 Level Recommendation: NA  AM-PAC Mobility Score   AM-PAC Inpatient Mobility Raw Score : 17       Treatment Time:  0930-3845  Treatment number: 3  Timed Code Treatment Minutes: 10 minutes  Total Treatment Minutes:  10  minutes    Cognition    A&O Person , other orientation questions not formally assessed. Able to follow 1 step commands    Subjective  Patient sitting at dining table in community room with no family present  Pt agreeable to this PT tx. Pain   No  Location:   Rating:  NA  Pain Medicine Status: Denies need     Bed Mobility   Supine to Sit:   Not Tested  Sit to Supine: Mod A for management of LEs, max cues for sequencing  Rolling:   Not Tested  Scooting:   Supervision with VC for set up    Transfer Training     Sit to stand:   SBA from chair without armrets x 2    Stand to sit:   Min A to EOB with maintaining hands on RW     CGA with VC for proper hand placement  Bed to Chair:  N/A with use of N/A    Gait Training gait completed as indicated below  Distance:  125 ft  Deviations (firm surface/linoleum): decreased antonette   Assistive Device Used:  rolling walker (RW)  Level of Assist: SBA  Comment:     Stair Training deferred, pt unsafe/not appropriate to complete stairs at this time  Pt ascended/descended  stairs with N/A with N/A, and use of N/A.   Pattern:

## 2019-07-03 NOTE — BH NOTE
[facility] Interdisciplinary Treatment Plan Note     Review Date & Time: 06/23/19 1243    Patient was not in treatment team.    Admission Type:   Admission Type: Involuntary    Reason for admission:  Reason for Admission: Patient admitted from the ER after being transferred from [facility] d/t increased aggressive behavior/combative toward staff/mental status changes/confusion    Estimated Length of Stay Update:  5-7 days   Estimated Discharge Date Update: 06/28-06/30    PATIENT STRENGTHS:  Patient Strengths:Strengths: Positive Support  Patient Strengths and Limitations:Limitations: Difficult relationships / poor social skills  Addictive Behavior:Addictive Behavior  In the past 3 months, have you felt or has someone told you that you have a problem with:  : None  Do you have a history of Chemical Use?: No  Do you have a history of Alcohol Use?: No  Do you have a history of Street Drug Abuse?: No  Histroy of Prescripton Drug Abuse?: No  Medical Problems:   Past Medical History:   Diagnosis Date    Arthritis     [facility]est[facility] [facility]     Fractured skull ([facility] [address] with bleeding on brain    Hyperlipidemia     Hypertension     Macular degeneration of right eye     Pneumonia     Thyroid disease        Risk:  Fall RiskTotal: 102  Italo Scale Italo Scale Score: 19  BVC Total: 1  Change in scores no.  Changes to plan of Care  no    Status EXAM:   Status and Exam  Normal: No  Facial Expression: Flat  Affect: Blunt  Level of Consciousness: Confused  Mood:Normal: No  Mood: Empty  Motor Activity:Normal: No  Motor Activity: Decreased  Interview Behavior: Cooperative  Preception: [place] to Person  Attention:Normal: No  Attention: Distractible, Unable to Concentrate  Thought Processes: Blocking  Thought Content:Normal: No  Thought Content: Poverty of Content, Obsessions  Hallucinations: None  Delusions: No  Delusions: Obsessions  Memory:Normal: No  Memory: Poor Recent, Poor Remote  Insight and
ASHLEY approved and site visit went well with serena MCCOY at [facility]. [facility] will contact family about necessary steps for long-term care.
CTRS verbally invited and encouraged pt to attend 1100 AM Relaxation Group. Richie Carrier appeared to be sleeping in bed and did not rouse at invite. Pt did not attend group.     John Cuellar
CTRS verbally invited and encouraged pt to attend Recreation Group. Salma Norris appeared to be sleeping in bed and did not rouse at invite.  Pt did not attend group.     Katherine Storey, CTRS  Dileep Moreno [facility]
Discharge information faxed
Lisa Eb was excused from 1000 AM Leisure Ed Group due to [employer] and meeting with OT during time of invite.     Massimo Carolina
MT-BC verbally invited and encouraged pt to attend Community Meeting and Goals Group. Regina Bee appeared to be sleeping in bed and did not rouse at invite. Pt did not attend group.     ZAIRA Cardoza
MT-BC verbally invited and encouraged pt to attend [address] group. Regina Bee appeared to be sleeping and did not rouse at invite. Pt did not attend group.     Melba Hartley, MTBC
Patient demanding to go home encouraged patient to take meds, patient put part of meds in mouth and then put all meds in toilet
Pt was invited and encouraged to attend 1000 AM Leisure Ed Group. Pt declined invite to rest. Pt did not attend group.      Iris Morgan, LYDIAS  Caryn Paz, MT-BC
Regina Bee was invited and encouraged to attend 454 5656 PM Recreation Group. Regina Bee appeared to be asleep in milieu and did not rouse upon invite. Regina Bee did not attend invite.      Aravind Orantes
Time:2030    Type of Group:relaxation    Level of Participation:0%    Comments: pt sleeping
Writer invited pt to attend group at 1:45, pt was sleeping, pt did not attend group.      Shayy Orr MSW,LSW
Writer invited pt to attend recreational group at 2 pm, pt was sleep and did not arouse when prompted. Pt did not attend group.      Amish Avila MSW,LSW
Writer spoke with Marlyn Goldmann from [employer] as well as Joseph Rodriguez with [facility] financial department to collaborate for this patient.     Writer called patient's daughter Imtiaz Espinoza and recommended collaborating with Melody at [facility] with the financial piece to increase chances of this patient discharging directly to [facility] for long-term care from the hospital, which is goal of care team.
ZAIRA and LSW, MSW invited and encouraged pt to attend [place] [address] group. Marni Bruce declined invite, stating \"I'm tired,\" and laid down in bed. Pt did not attend group.     Umm Lee, ZAIRA Bae, LSW, MSW
ZAIRA verbally invited and encouraged pt to attend [address] group. Dennis Gomezs appeared to be asleep and did not rouse at invite. Pt did not attend group.     Aj Phillip, ZAIRA
ZAIRA verbally invited and encouraged pt to attend Community Meeting and Goals Group. Sallie Hernandez appeared to be sleeping in bed and did not rouse at invite. Pt did not attend group.     ZAIRA Veloz
14 06/16/2019    LABVLDL 19 11/05/2018       Guillermo Zeng, RN

## 2019-07-03 NOTE — DISCHARGE SUMMARY
family to try to identify a long-term care facility that could accept patient, but ultimately we were not able to make this happen directly from the hospital as patient did not have Medicaid in our attempts to help the family get patient on Medicaid were not successful during admission. Patient was discharged home to her daughter with home health care, referral to  and aging, referral to day programming, and referral to [employer] for further assistance with ultimate placement. 2. Patient was continued on her home medications for her chronic paroxysmal atrial fibrillation, hypertension, and hyperlipidemia. She had no acute medical events during her psychiatric admission. Internal medicine was consult it at the beginning of admission to [facility] her medical conditions. No changes were made to her previous outpatient medications. Please see med list above for specific details.     PE: (reviewed) and labs (see medical H&PE)  VITALS:  /63   Pulse 62   Temp 97.7 °F (36.5 °C) (Oral)   Resp 16   Ht 5' 4\" (1.626 m)   Wt 206 lb (93.4 kg)   SpO2 93%   BMI 35.36 kg/m²     Motor / Gait: no abnormalities noted, nml tone, no involuntary movements, normal gait and station - aided by walker  Cn: II-XII intact    Mental Status Examination:    Appearance: WF, appears stated age, wearing PJs, fair grooming and hygiene  Behavior/Attitude toward examiner: Cooperative, limited eye contact  Speech:  Normal rate, volume, tone, overall poverty  Mood: Euthymic  Affect:  Constricted  Thought processes:  Perseverative and impoverished  Thought Content: Denies SI, no specific HI, no paranoia, +confabulation  Perceptions: Denies AVH, not actively RTIS  Attention: Distractible  Abstraction: [place]  Cognition: Average ROSINA, Alert and oriented to person only, recall severely impaired  Insight: Poor insight   Judgment: Impaired judgment      Risk factor analysis:    Patient does have several risk factors for future

## 2019-07-03 NOTE — GROUP NOTE
Group Therapy Note    Date: July 3    Group Start Time: 9812  Group End Time: 601 [address]  Group Topic: Psychoeducation    [facility]; [facility]        Group Therapy Note    Attendees: 4    Patient's Goal: to engage in watching a movie about friendship and forgiveness and process the benefits of forgiving people. To achieve a relaxed state. Notes: Drinda Cockayne was late to group. Drinda Cockayne engaged in watching a movie for approximately 45 minutes. Drinda Cockayne appeared to be drowsy. Drinda Cockayne left group early to rest.     Status After Intervention:  Improved    Participation Level:  Active Listener and Interactive    Participation Quality: Appropriate, Attentive and Sharing      Speech:  normal      Thought Process/Content: Linear      Affective Functioning: Congruent      Mood: euthymic      Level of consciousness:  Drowsy      Response to Learning: Capable of insight, Able to change behavior and Progressing to goal      Endings: None Reported    Modes of Intervention: Education, Support, Socialization, Exploration, Clarifying, Problem-solving and Activity      Discipline Responsible: Psychoeducational Specialist      Signature:  Luz Morelos

## 2019-07-27 NOTE — ED PROVIDER NOTES
Sexual activity: Not on file   Lifestyle    Physical activity:     Days per week: Not on file     Minutes per session: Not on file    Stress: Not on file   Relationships    Social connections:     Talks on phone: Not on file     Gets together: Not on file     Attends Druze service: Not on file     Active member of club or organization: Not on file     Attends meetings of clubs or organizations: Not on file     Relationship status: Not on file    Intimate partner violence:     Fear of current or ex partner: Not on file     Emotionally abused: Not on file     Physically abused: Not on file     Forced sexual activity: Not on file   Other Topics Concern    Not on file   Social History Narrative    Not on file     No current facility-administered medications for this encounter. Current Outpatient Medications   Medication Sig Dispense Refill    carBAMazepine (TEGRETOL) 200 MG tablet Take 0.5 tablets by mouth 2 times daily 30 tablet 1    traZODone (DESYREL) 50 MG tablet Take 0.5 tablets by mouth nightly as needed for Sleep 15 tablet 1    melatonin 3 MG TABS tablet Take 2 tablets by mouth nightly 60 tablet 1    donepezil (ARICEPT) 5 MG tablet Take 1 tablet by mouth daily (with breakfast) 30 tablet 1    risperiDONE (RISPERDAL) 1 MG tablet Take 1 tablet in the morning, and 2 tablets at bedtime. 90 tablet 1    metoprolol succinate (TOPROL XL) 50 MG extended release tablet Take 50 mg by mouth daily      senna (SENOKOT) 8.6 MG tablet Take 1 tablet by mouth every 48 hours       atorvastatin (LIPITOR) 20 MG tablet Take 20 mg by mouth daily.       amiodarone (CORDARONE) 200 MG tablet Take 200 mg by mouth nightly        Allergies   Allergen Reactions    Latex Itching    Methylprednisolone Other (See Comments)     Steroid induced psychosis    Citalopram     Daypro [Oxaprozin]     Iodine     Ipratropium-Albuterol     Lovastatin     Pcn [Penicillins]     Sulfa Antibiotics     Symbicort strength 5/5 in all extremities. Sensory function grossly intact throughout to light touch and pain. Speech is normal.    PSYCHIATRIC: Awake and cooperative. No evidence of hallucinations. Generally weak and confused in demeanor.       I have reviewed and interpreted all of the currently available lab results from this visit (if applicable):  Results for orders placed or performed during the hospital encounter of 07/27/19   Ethanol   Result Value Ref Range    Ethanol Lvl None Detected mg/dL   Drug screen multi urine   Result Value Ref Range    Amphetamine Screen, Urine Neg Negative <1000ng/mL    Barbiturate Screen, Ur Neg Negative <200 ng/mL    Benzodiazepine Screen, Urine Neg Negative <200 ng/mL    Cannabinoid Scrn, Ur Neg Negative <50 ng/mL    Cocaine Metabolite Screen, Urine Neg Negative <300 ng/mL    Opiate Scrn, Ur Neg Negative <300 ng/mL    PCP Screen, Urine Neg Negative <25 ng/mL    Methadone Screen, Urine Neg Negative <300 ng/mL    Propoxyphene Scrn, Ur Neg Negative <300 ng/mL    pH, UA 6.0     Drug Screen Comment: see below     Oxycodone Urine Neg Negative <100 ng/ml   CBC Auto Differential   Result Value Ref Range    WBC 3.9 (L) 4.0 - 11.0 K/uL    RBC 4.23 4.00 - 5.20 M/uL    Hemoglobin 13.8 12.0 - 16.0 g/dL    Hematocrit 40.6 36.0 - 48.0 %    MCV 96.0 80.0 - 100.0 fL    MCH 32.6 26.0 - 34.0 pg    MCHC 34.0 31.0 - 36.0 g/dL    RDW 14.5 12.4 - 15.4 %    Platelets 501 (L) 703 - 450 K/uL    MPV 8.4 5.0 - 10.5 fL    Neutrophils % 78.4 %    Lymphocytes % 14.6 %    Monocytes % 6.4 %    Eosinophils % 0.3 %    Basophils % 0.3 %    Neutrophils # 3.0 1.7 - 7.7 K/uL    Lymphocytes # 0.6 (L) 1.0 - 5.1 K/uL    Monocytes # 0.2 0.0 - 1.3 K/uL    Eosinophils # 0.0 0.0 - 0.6 K/uL    Basophils # 0.0 0.0 - 0.2 K/uL   Comprehensive Metabolic Panel w/ Reflex to MG   Result Value Ref Range    Sodium 130 (L) 136 - 145 mmol/L    Potassium reflex Magnesium 5.4 (H) 3.5 - 5.1 mmol/L    Chloride 92 (L) 99 - 110 mmol/L    CO2 27 21 - 32 mmol/L    Anion Gap 11 3 - 16    Glucose 107 (H) 70 - 99 mg/dL    BUN 14 7 - 20 mg/dL    CREATININE 1.3 (H) 0.6 - 1.2 mg/dL    GFR Non-African American 39 (A) >60    GFR  47 (A) >60    Calcium 9.5 8.3 - 10.6 mg/dL    Total Protein 7.0 6.4 - 8.2 g/dL    Alb 4.0 3.4 - 5.0 g/dL    Albumin/Globulin Ratio 1.3 1.1 - 2.2    Total Bilirubin 0.4 0.0 - 1.0 mg/dL    Alkaline Phosphatase 84 40 - 129 U/L    ALT 23 10 - 40 U/L    AST 28 15 - 37 U/L    Globulin 3.0 g/dL   Troponin   Result Value Ref Range    Troponin <0.01 <0.01 ng/mL   TSH without Reflex   Result Value Ref Range    TSH 2.05 0.27 - 4.20 uIU/mL   Urinalysis Reflex to Culture   Result Value Ref Range    Color, UA Yellow Straw/Yellow    Clarity, UA SL CLOUDY (A) Clear    Glucose, Ur Negative Negative mg/dL    Bilirubin Urine SMALL (A) Negative    Ketones, Urine Negative Negative mg/dL    Specific Gravity, UA 1.020 1.005 - 1.030    Blood, Urine Negative Negative    pH, UA 6.0 5.0 - 8.0    Protein, UA TRACE (A) Negative mg/dL    Urobilinogen, Urine 0.2 <2.0 E.U./dL    Nitrite, Urine Negative Negative    Leukocyte Esterase, Urine Negative Negative    Microscopic Examination YES     Urine Reflex to Culture Not Indicated     Urine Type Not Specified    Lactic Acid, Plasma   Result Value Ref Range    Lactic Acid 0.8 0.4 - 2.0 mmol/L   Microscopic Urinalysis   Result Value Ref Range    Mucus, UA 2+ (A) /LPF    WBC, UA 0-2 0 - 5 /HPF    RBC, UA 3-5 (A) 0 - 2 /HPF    Epi Cells 0-2 /HPF    Bacteria, UA 1+ (A) /HPF   Carbamazepine level, total   Result Value Ref Range    Carbamazepine Lvl 5.7 4.0 - 12.0 ug/mL    Carbamazepine Dose Unknown    Blood Gas, Venous   Result Value Ref Range    pH, Henrry 7.399 7.350 - 7.450    pCO2, Henrry 47.1 40.0 - 50.0 mmHg    pO2, Henrry 56.4 (H) 25.0 - 40.0 mmHg    HCO3, Venous 28.5 23.0 - 29.0 mmol/L    Base Excess, Henrry 2.9 -3.0 - 3.0 mmol/L    O2 Sat, Henrry 89 Not Established %    Carboxyhemoglobin 2.5 (H) 0.0 - 1.5 %    MetHgb, Henrry